# Patient Record
Sex: FEMALE | Race: WHITE | NOT HISPANIC OR LATINO | Employment: UNEMPLOYED | URBAN - METROPOLITAN AREA
[De-identification: names, ages, dates, MRNs, and addresses within clinical notes are randomized per-mention and may not be internally consistent; named-entity substitution may affect disease eponyms.]

---
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
## 2019-10-08 ENCOUNTER — TELEPHONE (OUTPATIENT)
Dept: PSYCHIATRY | Facility: CLINIC | Age: 19
End: 2019-10-08

## 2019-10-08 NOTE — TELEPHONE ENCOUNTER
PSYCHIATRY CLINIC PHONE INTAKE     SERVICES REQUESTED / INTERESTED IN          Med Management    Presenting Problem and Brief History                              What would you like to be seen for? (brief description):  Pt  Needs a new provider now that she's an adult. She was seeing a  in pediatrics, but he retired. She takes prozac- liquid version of 1 tsp. She;s been on it for years and has never needed a change. When she;s on her medications, she's far more tolerant and patient, and isn't grabby like she used to or pinchy. Sleep is pretty well. She likes to hang in her room often, so she may take a nap during the day, and she may stay up a little later at night. Its tough for her to get out and do things outside of the house. She can be very routine, and can have outburst if she hasn't time to process something new. She may start kicking the wall until there's a hole in it, and she may get hooked on it, because of the attention she gets from it. She's non-verbal.      Have you received a mental health diagnosis? Yes   Which one (s): Autism and sensory integration dysfunction.   Is there any history of developmental delay?  Yes   Are you currently seeing a mental health provider?  No            Who / month last seen:  NA  Do you have mental health records elsewhere?  No  Will you sign a release so we can obtain them?  No    Have you ever been hospitalized for psychiatric reasons?  No  Describe:  NA    Do you have current thoughts of self-harm?  No    Do you currently have thoughts of harming others?  Yes  - In the moment when she's upset or trying to get your attention.      Substance Use History     Do you have any history of alcohol / illicit drug use?  No  Describe:  NA  Have you ever received treatment for this?  No    Describe:  NA     Social History     Does the patient have a guardian?  No    Name / number: NA  Have you had an ACT team in last 12 months?  No  Describe: NA   Do you have any current  or past legal issues?  No  Describe: NA   OK to leave a detailed voicemail?  Yes    Medical/ Surgical History                                   Patient Active Problem List   Diagnosis     XT (exotropia), intermittent     Hypermetropia     Astigmatism     Autism          Medications             Current Outpatient Medications   Medication Sig Dispense Refill     FLUoxetine (PROZAC) 20 MG/5ML solution Take 5 mLs (20 mg) by mouth daily 150 mL 3         DISPOSITION      10/8/19 Intake completed. Sending to [facility] for review.  Marisel Álvarez,

## 2019-11-01 ENCOUNTER — TRANSFERRED RECORDS (OUTPATIENT)
Dept: HEALTH INFORMATION MANAGEMENT | Facility: CLINIC | Age: 19
End: 2019-11-01

## 2019-11-08 ENCOUNTER — TRANSFERRED RECORDS (OUTPATIENT)
Dept: HEALTH INFORMATION MANAGEMENT | Facility: CLINIC | Age: 19
End: 2019-11-08

## 2020-02-19 ENCOUNTER — OFFICE VISIT (OUTPATIENT)
Dept: PSYCHIATRY | Facility: CLINIC | Age: 20
End: 2020-02-19
Attending: NURSE PRACTITIONER
Payer: COMMERCIAL

## 2020-02-19 VITALS
HEART RATE: 121 BPM | WEIGHT: 200 LBS | BODY MASS INDEX: 37.57 KG/M2 | SYSTOLIC BLOOD PRESSURE: 108 MMHG | DIASTOLIC BLOOD PRESSURE: 75 MMHG

## 2020-02-19 DIAGNOSIS — F84.0 AUTISM: Primary | ICD-10-CM

## 2020-02-19 PROCEDURE — G0463 HOSPITAL OUTPT CLINIC VISIT: HCPCS | Mod: ZF

## 2020-02-19 RX ORDER — LORAZEPAM 2 MG/ML
SOLUTION, CONCENTRATE ORAL PRN
COMMUNITY
Start: 2019-05-02 | End: 2022-04-26

## 2020-02-19 ASSESSMENT — PAIN SCALES - GENERAL: PAINLEVEL: NO PAIN (0)

## 2020-02-19 NOTE — PROGRESS NOTES
PSYCHIATRY CHILD CLINIC TRANSFER  NOTE        The initial diagnostic evaluation was in 2009 through the autism clinic.    Pertinent Background:  This patient experienced developmental delay in early childhood and was diagnosed with autism at that time. Mental health issues occurred at school age and she first obtained mental health care at that time. She has received services within the community and through the school district for autism. She has received mental health services for anxiety, rigidity, and aggression, associated with the diagnosis of autism.  Notably, Hoda struggles with transitions and changes in routine. She may become aggressive towards others (pinching and scratching) during these times, but is more likely to engage in self-injury  Psych critical item history includes SIB [hitting self] and violent behavior.     INTERIM HISTORY                                                   Estuardo Hernadez is a 19 year old female who was last seen in clinic on 8/9/2017 at which time fluoxetine 20 mg daily was continued.    Since the last visit she continues to take fluoxetine 20 mg PO Q Day. Mother reports that fluoxetine seems to help most with high reactivity and she pinches others less. Medications have been managed by PCP since the [facility] of Dr Mayo. She has not required any medication changes. She is engaged in a transition program at school which she graduates from this year.     Social Updates (home/ school/ substance use): When not in school, Hoda spends most of her time at home. She does not use any substances, nor is she exposed to them.   Family relationships: Hoda is cared for primarily by her mother and older sister. She also lives with an older brother who is also on the autism spectrum. She has two other older brothers, one in the , and one who recently moved into a group home after a TBI. She does not currently have contact with her father, who left the home some time ago  and now lives with his partner. She has not met her step-mother.     School: [employer]  Year: will graduate in May from transition program at school  IEP/504/Special Education: IEP for ASD  Suspensions/Expulsions: none  School functioning: stable with significant supports and staffing    RECENT SYMPTOMS:   DYSREGULATION:  reports-impulsive, aggressive and physically agitated;  DENIES- suicidal ideation and SIB  ANXIETY:  feeling fearful and nervous/overwhelmed   EATING DISORDER: none    RECENT SUBSTANCE USE:     ALCOHOL- none          TOBACCO- none          CAFFEINE- no caffeine        OPIOIDS- none           NARCAN KIT- N/A    CANNABIS- none         OTHER ILLICIT DRUGS- none     CURRENT SOCIAL HISTORY:  Financial Support- social security disability, DD waiver on CDCS and get a SW through that.     Children- none.     Living Situation- lives at home with Mom, older brother (also with autism, and older sister, who provides PCA services. Mother has bought a lot in [place] and plans to buils a new home that will be able to also house Hoda, her brother with autism, and her brother with TBI as well as 25/7 staffing.   Social/Spiritual Support- family identifies as Mormonism.     Feels Safe at Home- Yes.    MEDICAL ROS:  Reports A comprehensive review of systems was performed and is negative other than noted in the HPI.    SUBSTANCE USE HISTORY                                                                             Past Use- none  Treatment [#, most recent] - none  Medical Consequences [withdrawal, sz etc] - none  HIV/Hepatitis- none  Legal Consequences- none    PSYCHIATRIC HISTORY     SIB [method, most recent]- will hit self  Suicidal Ideation Hx [passive, active]- none  Suicide Attempt [#, recent, method]:   #- N/A   Most Recent- N/A    Violence/Aggression Hx- sometimes pinches and slaps others when upset or anxious  Psychosis Hx- none  Psych Hosp [ #, most recent, committed]- none  ECT [#, most  recent]- none    Eating Disorder- none    Outpatient Programs [ DBT, Day Treatment, Eating Disorder Tx etc] : transition programing currently through school    SOCIAL and FAMILY HISTORY                                          patient reported     Trauma History (self-report)- none  Legal- none  Social/Spiritual Support- Hoda is close with her mother, brother who lives with her, and her older sister. Older sister and mother are primary caretakers.   Early History/Education-  IEP in school since early childhood for autism and developmental delay  Family Mental Health History-  Autism, learning disability  Financial Support- social security disability and DD waiver and CDCS, some voc rehab through school Mom sets budget currently gets 60 hours of PCA currently, mostly mom and sister                PAST PSYCH MED TRIALS   Prozac, moderately effective  Trazodone, in past for sleep    MEDICAL / SURGICAL HISTORY                                   CARE TEAM:    Older sister is PCA      PCP- Vilma Leo, [facility]                   Therapist- none currently    Pregnant or breastfeeding:  NO      Contraception- mother planning to have IUD placed in near future to help with periods. Hoda does not tolerate periods well and struggles with self-care during this time.  Patient Active Problem List   Diagnosis     XT (exotropia), intermittent     Hypermetropia     Astigmatism     Autism       ALLERGY                                Citrus and Dairy aid [lactase]  MEDICATIONS                               Current Outpatient Medications   Medication Sig Dispense Refill     FLUoxetine (PROZAC) 20 MG/5ML solution Take 5 mLs (20 mg) by mouth daily 150 mL 3     LORAZEPAM INTENSOL 2 MG/ML PO (HIGH CONC) solution as needed PRN for a dental procedure         VITALS   /75   Pulse 121   Wt 90.7 kg (200 lb)   BMI 37.57 kg/m     MENTAL STATUS EXAM                                                              Alertness: alert  and oriented  Appearance: casually groomed  Behavior/Demeanor: cooperative, with poor eye contact   Speech: Non-verbal  Language: non-verbal  Psychomotor: restless and fidgety  Mood: unable to report own mood, but appeared restless and anxious  Affect: flat; was not congruent to mood; was not congruent to content  Thought Process/Associations: unable to self-report. Did follow some prompts  Thought Content:  Reports none;  Denies suicidal and violent ideation  Perception: did not appear to be responding to internal stimuli  Insight: limited  Judgment: limited  Cognition: does not appear grossly intact; formal cognitive testing was not done    LABS and DATA     No flowsheet data found.      PSYCHIATRIC DIAGNOSES                                                                                                 Autism spectrum disorder  Sensory integration dysfunction     ASSESSMENT                                   TODAY   Hoda presents to clinic today overall cooperative, but restless. She presents today with her mother and her older sister, who serves as her PCA. She was observed to plug her ears and rock throughout the appointment time. She at times slapped herself in the face but was distracted for a bit by a fidget spinner. She responded well and calmed some when spoken to by mother or sister. Mother reports that Hoda seems to be well managed overall on her current dose of fluoxetine. She states that she is generally less anxious and is able to tolerate transitions better than without the fluoxetine on board. She does not feel medications need to be adjusted at this time. Plan made to continue fluoxetine 20 mg PO Q Day. Mother is unsure if they will be able to return to clinic in less than a year and reports that previously they were seen by Dr Mayo annually. Will follow-up with staff to see if Hoda is a candidate for telephone encounters due to low tolerance for car rides and new settings.  Mother informed that she may call with any questions, concerns, or if she feels an earlier appointment is necessary.                               PLAN                                                                                                       1) MEDICATION:      - Continue Prozac 20 mg PO Q Day   No refills needed at this time    2) THERAPY:  May recommend increasing services and day programming after moving to [facility]    3) LABS NEXT DUE:  none       RATING SCALES:     N/A    4) REFERRALS [CD, medical, other]:  none    5) :  none    6) RTC: will coordinate with staff to see if candidate for telephone visits. Mother unsure if they are able to return for approximately 1 year.    7) CRISIS NUMBERS: Provided by request in AVS      TREATMENT RISK STATEMENT:  The risks, benefits, alternatives and potential adverse effects have been discussed and are understood by the patient/ patient's guardian. The pt understands the risks of using street drugs or alcohol.  There are no medical contraindications, the pt agrees to treatment with the ability to do so.  The patient understands to call 911 or come to the nearest ED if life threatening or urgent symptoms present.

## 2020-02-20 ENCOUNTER — TELEPHONE (OUTPATIENT)
Dept: PSYCHIATRY | Facility: CLINIC | Age: 20
End: 2020-02-20

## 2020-02-20 NOTE — TELEPHONE ENCOUNTER
On 2/19/2020 the patient's guardian signed an SILVINO authorizing medical records to be released from [facility] to [facility] for the purpose of continuing care. I faxed the request to [phone], [phone]. I sent the original to SILVINO to scanning and kept a copy in psychiatry until scanning is complete.  Annalee Flores, CMA

## 2020-02-23 ENCOUNTER — HEALTH MAINTENANCE LETTER (OUTPATIENT)
Age: 20
End: 2020-02-23

## 2020-02-25 ENCOUNTER — TELEPHONE (OUTPATIENT)
Dept: PSYCHIATRY | Facility: CLINIC | Age: 20
End: 2020-02-25

## 2020-02-25 NOTE — TELEPHONE ENCOUNTER
On 2/24/2020, 54 faxed pages were received from [employer]. Writer placed the records in Anjelica Clinton's folder for review. Message routed to provider. ELAINA Geel, EMT

## 2020-02-27 ENCOUNTER — TELEPHONE (OUTPATIENT)
Dept: PSYCHIATRY | Facility: CLINIC | Age: 20
End: 2020-02-27

## 2020-02-27 NOTE — TELEPHONE ENCOUNTER
On 2/27/2020, [facility] This writer put a copy of the records in Anjelica Gaming's folder upfront and this was routed to Mirtha, please shred your copy when finished.  I sent the original documents to scanning on 2/27/2020 and held a copy in Psychiatry until scanning is confirmed. Britt Flores, CMA

## 2020-12-06 ENCOUNTER — HEALTH MAINTENANCE LETTER (OUTPATIENT)
Age: 20
End: 2020-12-06

## 2021-02-20 NOTE — TELEPHONE ENCOUNTER
Dr. Mayo    Received refill request for Fluoxetine 20 mg/5mL liquid  , quantity 150.    Please advise    Thanks,    Rissa   Diagnosis Line Sinus bradycardia  Early repolarization       Confirmed by GEOVANY MANE MD ([id number]) on 2/22/2021 12:55:07 PM

## 2021-04-11 ENCOUNTER — HEALTH MAINTENANCE LETTER (OUTPATIENT)
Age: 21
End: 2021-04-11

## 2021-04-21 ENCOUNTER — DOCUMENTATION ONLY (OUTPATIENT)
Dept: OTHER | Facility: CLINIC | Age: 21
End: 2021-04-21

## 2021-05-20 ENCOUNTER — VIRTUAL VISIT (OUTPATIENT)
Dept: PSYCHIATRY | Facility: CLINIC | Age: 21
End: 2021-05-20
Attending: NURSE PRACTITIONER
Payer: COMMERCIAL

## 2021-05-20 DIAGNOSIS — F84.0 AUTISM: Primary | ICD-10-CM

## 2021-05-20 DIAGNOSIS — F84.0 ACTIVE AUTISTIC DISORDER: ICD-10-CM

## 2021-05-20 PROCEDURE — 99214 OFFICE O/P EST MOD 30 MIN: CPT | Mod: GT | Performed by: NURSE PRACTITIONER

## 2021-05-20 RX ORDER — FLUOXETINE 20 MG/5ML
20 SOLUTION ORAL DAILY
Qty: 450 ML | Refills: 0 | Status: SHIPPED | OUTPATIENT
Start: 2021-05-20 | End: 2021-08-19

## 2021-05-20 ASSESSMENT — PAIN SCALES - GENERAL: PAINLEVEL: NO PAIN (0)

## 2021-05-20 NOTE — PROGRESS NOTES
"VIDEO VISIT  Estuardo Hernadez is a 21 year old patient who is being evaluated via a billable video visit.      The patient has been notified of following:   \"This video visit will be conducted via a call between you and your physician/provider. We have found that certain health care needs can be provided without the need for an in-person physical exam. This service lets us provide the care you need with a video conversation. If a prescription is necessary we can send it directly to your pharmacy. If lab work is needed we can place an order for that and you can then stop by our lab to have the test done at a later time. Insurers are generally covering virtual visits as they would in-office visits so billing should not be different than normal.  If for some reason you do get billed incorrectly, you should contact the billing office to correct it and that number is in the AVS .    Video Conference to be completed via:  Madeleine    Patient has given verbal consent for video visit?:  Yes    Patient would prefer that any video invitations be sent by: Send to e-mail at: [email or web address]      How would patient like to obtain AVS?:  Keyla    AVS SmartPhrase [PsychAVS] has been placed in 'Patient Instructions':  Yes    "

## 2021-05-20 NOTE — PATIENT INSTRUCTIONS
**For crisis resources, please see the information at the end of this document**     Patient Education      Thank you for coming to the [facility] MENTAL HEALTH & ADDICTION [place] [facility]IN[facility].    Lab Testing:  If you had lab testing today and your results are reassuring or normal they will be mailed to you or sent through ServiceGems within 7 days. If the lab tests need quick action we will call you with the results. The phone number we will call with results is # [phone] (home) . If this is not the best number please call our clinic and change the number.    Medication Refills:  If you need any refills please call your pharmacy and they will contact us. Our fax number for refills is [phone]. Please allow three business for refill processing. If you need to  your refill at a new pharmacy, please contact the new pharmacy directly. The new pharmacy will help you get your medications transferred.     Scheduling:  If you have any concerns about today's visit or wish to schedule another appointment please call our office during normal business hours [phone] (8-5:00 M-F)    Contact Us:  Please call [phone] during business hours (8-5:00 M-F).  If after clinic hours, or on the weekend, please call  [phone].    Financial Assistance [phone]  [facility] Billing [phone]  Central Billing Office, [facility]: [phone]  [facility] Billing [phone]  Medical Records [phone]  [facility] Patient Bill of Rights [email or web address]       MENTAL HEALTH CRISIS NUMBERS:  For a medical emergency please call  911 or go to the nearest ER.     [facility]:   [facility] -[phone]   Crisis Residence [facility] Residence -[phone]   Walk-In Counseling Center [facility] -[phone]   COPE 24/7 [place] Mobile Team -[phone] (adults)/[phone] (child)  CHILD: [facility] needs assessment  team - [phone]      [facility]:   [facility] - [phone]   Walk-in counseling [facility] - [phone]   Walk-in counseling [facility] - [phone]   Crisis Residence [facility] - [phone]  Urgent Care Adult Mental Rugojj-[phone] mobile unit/ 24/7 crisis line    National Crisis Numbers:   National Suicide Prevention Lifeline: 7-355-313-TALK (130-934-7879)  Poison Control Center - 0-294-366-3752  [facility]/resources for a list of additional resources (SOS)  Trans Lifeline a hotline for transgender people 1-744.919.6456  The Alin Project a hotline for LGBT youth 7-364-760-2711  Crisis Text Line: For any crisis 24/7   To: 425483  see www.crisistextline.org  - IF MAKING A CALL FEELS TOO HARD, send a text!         Again thank you for choosing [facility] MENTAL HEALTH & ADDICTION [facility] and please let us know how we can best partner with you to improve you and your family's health.    You may be receiving a survey regarding this appointment. We would love to have your feedback, both positive and negative. The survey is done by an external company, so your answers are anonymous.

## 2021-05-20 NOTE — PROGRESS NOTES
"VIDEO VISIT  Estuardo Hernadez is a 21 year old patient who is being evaluated via a billable video visit.      The patient has been notified of following:   \"This video visit will be conducted via a call between you and your physician/provider. We have found that certain health care needs can be provided without the need for an in-person physical exam. This service lets us provide the care you need with a video conversation. If a prescription is necessary we can send it directly to your pharmacy. If lab work is needed we can place an order for that and you can then stop by our lab to have the test done at a later time. Insurers are generally covering virtual visits as they would in-office visits so billing should not be different than normal.  If for some reason you do get billed incorrectly, you should contact the billing office to correct it and that number is in the AVS .    Video Conference to be completed via:  Madeleine    Patient has given verbal consent for video visit?:  Yes    Patient would prefer that any video invitations be sent by: Send to e-mail at: [email or web address]      How would patient like to obtain AVS?:  [employer]    AVS SmartPhrase [PsychAVS] has been placed in 'Patient Instructions':  Yes  Video- Visit Details  Type of service:  video visit for medication management  Time of service:    Date:  05/20/2021    Video Start Time:  10:31       Video End Time:  11:22    Reason for video visit:  Patient unable to travel due to Covid-19  Originating Site (patient location):  [facility]   Location- Patient's home  Distant Site (provider location):  Remote location  Mode of Communication:  Video Conference via Doxy.me  Consent:  Patient has given verbal consent for video visit?: Yes     PSYCHIATRY CLINIC PROGRESS NOTE    30 minute medication management   IDENTIFICATION: Estuardo Hernadez is a 21 year old female with previous psychiatric diagnoses of autism. Pt presents for ongoing " psychiatric follow-up and was seen for initial diagnostic evaluation on 2/19/2020.  SUBJECTIVE / INTERIM HISTORY     The pt was last seen in clinic 2/19/2021 at which time no medication changes were made. The patient reports good medication adherence. Since the last visit, she has been taking her medication daily as prescribed. Her PCP has been bridging the prescription during the last year. She has finished school with significant modification by Mom at home. She was not interested in engaging at school at home due to it being out of routine.     SYMPTOMS include ongoing mood stability. Per Mom, she has tried in the past to stop Prozac and Hoda became more reactive and quick to scratch family when upset. She feels the current dose is sufficient in managing these symptoms. She does continue to pick at skin or other items and is difficult to redirect at times. Mom reports limiting reactions is best for redirection.  She continues to struggle with over eating. If she sees the entire container she feels she needs to eat it until it is empty. Family manages this by putting food in separate containers. No safety concerns reported today.    Current Substance Use- none. Sober support- na     MEDICAL ROS          Reports A comprehensive review of systems was performed and is negative other than noted in the HPI.     PAST MEDICATION TRIALS    Prozac, moderately effective  Trazodone, in past for sleep  MEDICAL HISTORY      Primary Care Physician: Clinic, [facility] [address] [facility] [address]     Neurologic Hx:  head injury- denies     seizure- denies      LOC- denies    other- na   Patient Active Problem List   Diagnosis     XT (exotropia), intermittent     Hypermetropia     Astigmatism     Autism     ALLERGY       Allergies   Allergen Reactions     Citrus Dermatitis, Diarrhea, GI Disturbance and Other (See Comments)     Dairy Aid [Lactase]        MEDICATIONS      Current Outpatient Medications   Medication  Sig     FLUoxetine (PROZAC) 20 MG/5ML solution Take 5 mLs (20 mg) by mouth daily     LORAZEPAM INTENSOL 2 MG/ML PO (HIGH CONC) solution as needed PRN for a dental procedure     No current facility-administered medications for this visit.        Drug Interaction Check is remarkable for:  None  VITALS    There were no vitals taken for this visit.  LABS  use PSYCHLAB______       none    MENTAL STATUS EXAM     Alertness: alert  and oriented  Appearance: casually groomed  Behavior/Demeanor: calm, with poor eye contact  Speech: Pt did not speak  Language: Pt is mostly non verbal  Psychomotor: fidgety  Mood:  description consistent with euthymia  Affect: blunted; was congruent to mood; was congruent to content  Thought Process/Associations: unable to self-report but pt appeared to track conversation and follow direction.   Thought Content: Pt unable to self-report but no suicidal or homicidal behaviors observed  Perception: Pt unable to self-report but does not appear to be responding to internal stimuli  Insight: limited  Judgment: limited  Cognition: does not appear grossly intact; formal cognitive testing was not done     PSYCHOLOGICAL TESTING:     none    ASSESSMENT     Estuardo Hernadez is a 21 year old female with psychiatric diagnoses of autism. She was present for the majority of the appointment today and appeared to be watching the screen most of the time. Mom reports that the current medication plan has been effective and she would like to keep it the same. No changes at this time. Will follow-up in 3 months.       TREATMENT RISK STATEMENT:  The risks, benefits, alternatives and potential adverse effects have been explained and are understood by the pt and pt's parent(s)/guardian.  Discussion of specific concerns included- N/A. The  pt and pt's parent(s)/guardian agrees to the treatment plan with the ability to do so. The  pt and pt's parent(s)/guardian knows to call the clinic for any problems or access  emergency care if needed. There are no medical considerations relevant to treatment, as noted above. Substance use is not a problem as noted above.      Drug interaction check was done for any med changes and is discussed above.      DIAGNOSES                                                                                                      Encounter Diagnosis   Name Primary?     Active autistic disorder                                  PLAN                                                                                                 Medication Plan:         -- Continue Prozac 20 mg PO Q Day   90 days sent     Labs:  none    Pt monitor [call for probs]: nothing specific needed    THERAPY: No Change    REFERRALS [CD, medical, other]:  none    :  none    Controlled Substance Contract was not completed    RTC: 3 months    CRISIS NUMBERS: Provided in AVS upon request of patient/guardian.

## 2021-08-19 ENCOUNTER — VIRTUAL VISIT (OUTPATIENT)
Dept: PSYCHIATRY | Facility: CLINIC | Age: 21
End: 2021-08-19
Attending: NURSE PRACTITIONER
Payer: COMMERCIAL

## 2021-08-19 DIAGNOSIS — F84.0 AUTISM: Primary | ICD-10-CM

## 2021-08-19 DIAGNOSIS — F84.0 ACTIVE AUTISTIC DISORDER: ICD-10-CM

## 2021-08-19 PROCEDURE — 99214 OFFICE O/P EST MOD 30 MIN: CPT | Mod: GT | Performed by: NURSE PRACTITIONER

## 2021-08-19 RX ORDER — FLUOXETINE 20 MG/5ML
20 SOLUTION ORAL DAILY
Qty: 450 ML | Refills: 0 | Status: SHIPPED | OUTPATIENT
Start: 2021-08-19 | End: 2022-01-19

## 2021-08-19 ASSESSMENT — PAIN SCALES - GENERAL: PAINLEVEL: NO PAIN (0)

## 2021-08-19 NOTE — PATIENT INSTRUCTIONS
**For crisis resources, please see the information at the end of this document**     Patient Education      Thank you for coming to the [facility] MENTAL HEALTH & ADDICTION [place] [facility].    Lab Testing:  If you had lab testing today and your results are reassuring or normal they will be mailed to you or sent through JobOn within 7 days. If the lab tests need quick action we will call you with the results. The phone number we will call with results is # [phone] (home) . If this is not the best number please call our clinic and change the number.    Medication Refills:  If you need any refills please call your pharmacy and they will contact us. Our fax number for refills is [phone]. Please allow three business for refill processing. If you need to  your refill at a new pharmacy, please contact the new pharmacy directly. The new pharmacy will help you get your medications transferred.     Scheduling:  If you have any concerns about today's visit or wish to schedule another appointment please call our office during normal business hours [phone] (8-5:00 M-F)    Contact Us:  Please call [phone] during business hours (8-5:00 M-F).  If after clinic hours, or on the weekend, please call  [phone].    Financial Assistance [phone]  [facility] Billing [phone]  Central Billing Office, [facility]: [phone]  [facility] Billing [phone]  Medical Records [phone]  [facility] Patient Bill of Rights [email or web address]       MENTAL HEALTH CRISIS NUMBERS:  For a medical emergency please call  911 or go to the nearest ER.     [facility]:   [facility] -[phone]   Crisis Residence [facility] Residence -[phone]   Walk-In Counseling Center [facility] -[phone]   COPE 24/7 [place] Mobile Team -[phone] (adults)/[phone] (child)  CHILD: [facility] needs assessment  team - [phone]      [facility]:   [facility] - [phone]   Walk-in counseling [facility] House - [phone]   Walk-in counseling [facility] - [phone]   Crisis Residence [facility] Res[facility] - [phone]  Urgent Care Adult Mental Ffjmju-[phone] mobile unit/ 24/7 crisis line    National Crisis Numbers:   National Suicide Prevention Lifeline: 9-048-553-TALK (946-048-4755)  Poison Control Center - 5-101-001-1303  [employer]/resources for a list of additional resources (SOS)  Trans Lifeline a hotline for transgender people 1-509.836.1203  The Alin Project a hotline for LGBT youth 3-822-764-4124  Crisis Text Line: For any crisis 24/7   To: 239213  see www.crisistextline.org  - IF MAKING A CALL FEELS TOO HARD, send a text!         Again thank you for choosing [facility] MENTAL HEALTH & ADDICTION [facility] and please let us know how we can best partner with you to improve you and your family's health.    You may be receiving a survey regarding this appointment. We would love to have your feedback, both positive and negative. The survey is done by an external company, so your answers are anonymous.

## 2021-08-19 NOTE — PROGRESS NOTES
"VIDEO VISIT  Estuardo Hernadez is a 21 year old patient who is being evaluated via a billable video visit.      The patient has been notified of following:   \"This video visit will be conducted via a call between you and your physician/provider. We have found that certain health care needs can be provided without the need for an in-person physical exam. This service lets us provide the care you need with a video conversation. If a prescription is necessary we can send it directly to your pharmacy. If lab work is needed we can place an order for that and you can then stop by our lab to have the test done at a later time. Insurers are generally covering virtual visits as they would in-office visits so billing should not be different than normal.  If for some reason you do get billed incorrectly, you should contact the billing office to correct it and that number is in the AVS .    Video Conference to be completed via:  [employer]    Patient has given verbal consent for video visit?:  Yes    Patient would prefer that any video invitations be sent by: Send to e-mail at: [email or web address]      How would patient like to obtain AVS?:  [employer]    AVS SmartPhrase [PsychAVS] has been placed in 'Patient Instructions':  Yes  Video- Visit Details  Type of service:  video visit for medication management  Time of service:    Date:  08/19/2021    Video Start Time:  2:00        Video End Time:  2:25    Reason for video visit:  Patient unable to travel due to Covid-19  Originating Site (patient location):  [facility]   Location- Patient's home  Distant Site (provider location):  Remote location  Mode of Communication:  Video Conference via AmENOVIX  Consent:  Patient has given verbal consent for video visit?: Yes     PSYCHIATRY CLINIC PROGRESS NOTE    30 minute medication management   IDENTIFICATION: Estuardo Hernadez is a 21 year old female with previous psychiatric diagnoses of autism. Pt presents for ongoing " psychiatric follow-up and was seen for initial diagnostic evaluation on 2/19/2020.  SUBJECTIVE / INTERIM HISTORY     The pt was last seen in clinic 5/20/2021 at which time no medication changes were made. The patient reports good medication adherence. Since the last visit, has been taking her medication daily as prescribed. She has not displayed any side effects of the medication.     SYMPTOMS include ongoing mood stability. Per Mom, she continues to be less aggressive and irritable overall with Prozac. She continues to hit and kick at her drywall. She likes to pick it apart. Per Mom, she will eventually stop if it were to hurt. For example, she does not hit or kick the studs. She is spending more time in her room lately. Mom wonders if related to Kiran being out of his room more now that he is also not in school. No safety concerns reported today.  Current Substance Use- none. Sober support- na     MEDICAL ROS          Reports A comprehensive review of systems was performed and is negative other than noted in the HPI.     PAST MEDICATION TRIALS    Prozac, moderately effective  Trazodone, in past for sleep  MEDICAL HISTORY      Primary Care Physician: Clinic, [facility] at [address] [facility] [address]     Neurologic Hx:  head injury- none     seizure- none      LOC- none    other- na   Patient Active Problem List   Diagnosis     XT (exotropia), intermittent     Hypermetropia     Astigmatism     Autism     ALLERGY       Allergies   Allergen Reactions     Citrus Dermatitis, Diarrhea, GI Disturbance and Other (See Comments)     Dairy Aid [Lactase]        MEDICATIONS      Current Outpatient Medications   Medication Sig     FLUoxetine (PROZAC) 20 MG/5ML solution Take 5 mLs (20 mg) by mouth daily     LORAZEPAM INTENSOL 2 MG/ML PO (HIGH CONC) solution as needed PRN for a dental procedure     No current facility-administered medications for this visit.       Drug Interaction Check is remarkable  for:  None  VITALS    There were no vitals taken for this visit.  LABS  use PSYCHLAB______       none    MENTAL STATUS EXAM     Alertness: alert  and oriented  Appearance: casually groomed  Behavior/Demeanor: calm, with poor eye contact  Speech: Pt did not speak  Language: Pt is mostly non verbal  Psychomotor: fidgety  Mood:  description consistent with euthymia  Affect: blunted; was congruent to mood; was congruent to content  Thought Process/Associations: unable to self-report but pt appeared to track conversation and follow direction.   Thought Content: Pt unable to self-report but no suicidal or homicidal behaviors observed  Perception: Pt unable to self-report but does not appear to be responding to internal stimuli  Insight: limited  Judgment: limited  Cognition: does not appear grossly intact; formal cognitive testing was not done     PSYCHOLOGICAL TESTING:     none    ASSESSMENT     Estuardo Hernadez is a 21 year old female with psychiatric diagnoses of autism. She was engaged for only the beginning of the appointment today but did wave hello and goodbye after she was excused.  She also walked back over later in the appointment to look at the computer briefly. Mom reports that the current medication plan has been effective and she would like to keep it the same. No changes at this time. Will follow-up in 3 months.     TREATMENT RISK STATEMENT:  The risks, benefits, alternatives and potential adverse effects have been explained and are understood by the pt and pt's parent(s)/guardian.  Discussion of specific concerns included- N/A. The  pt and pt's parent(s)/guardian agrees to the treatment plan with the ability to do so. The  pt and pt's parent(s)/guardian knows to call the clinic for any problems or access emergency care if needed. There are no medical considerations relevant to treatment, as noted above. Substance use is not a problem as noted above.      Drug interaction check was done for any  med changes and is discussed above.      DIAGNOSES                                                                                                      Encounter Diagnosis   Name Primary?     Autism Yes                                 PLAN                                                                                                 Medication Plan:         -- Continue Prozac 20 mg PO Q Day                 90 days sent     Labs:  none    Pt monitor [call for probs]: nothing specific needed    THERAPY: No Change    REFERRALS [CD, medical, other]:  none    :  none    Controlled Substance Contract was not completed    RTC: 3 months    CRISIS NUMBERS: Provided in AVS upon request of patient/guardian.

## 2021-09-26 ENCOUNTER — HEALTH MAINTENANCE LETTER (OUTPATIENT)
Age: 21
End: 2021-09-26

## 2021-09-30 ENCOUNTER — TELEPHONE (OUTPATIENT)
Dept: PSYCHIATRY | Facility: CLINIC | Age: 21
End: 2021-09-30

## 2021-09-30 NOTE — LETTER
September 30, 2021      TO: Estuardo Hernadez  [address] [place] [address]       To whom it may concern,    Estuardo Hernadez is under my care at the [facility]. She is receiving treatment to manage autism [F84.0]. People with autism often experience sensory dysregulation and are sensitive to light, sound, and touch. When they become dysregulated, they may regress in behavior, become aggressive toward others, or become aggressive toward themselves. Motions like rocking are often helpful with self-regulation.  has already shown benefit from rocking motions in the office chair she currently has at home. Unfortunately, standard office furniture is not designed with this use in mind. For these reasons,  should be ordered a rocking chair for self-regulation and sensory integration.      Sincerely,    Anjelica Harrell DNP, PMHNP-BC  Signed electronically 9/30/2021 at 12:46

## 2022-01-19 ENCOUNTER — VIRTUAL VISIT (OUTPATIENT)
Dept: PSYCHIATRY | Facility: CLINIC | Age: 22
End: 2022-01-19
Payer: COMMERCIAL

## 2022-01-19 DIAGNOSIS — F84.0 ACTIVE AUTISTIC DISORDER: ICD-10-CM

## 2022-01-19 DIAGNOSIS — F84.0 AUTISM: Primary | ICD-10-CM

## 2022-01-19 PROCEDURE — 99214 OFFICE O/P EST MOD 30 MIN: CPT | Mod: GT | Performed by: NURSE PRACTITIONER

## 2022-01-19 RX ORDER — FLUOXETINE 20 MG/5ML
20 SOLUTION ORAL DAILY
Qty: 450 ML | Refills: 0 | Status: SHIPPED | OUTPATIENT
Start: 2022-01-19 | End: 2022-04-26

## 2022-01-19 NOTE — PROGRESS NOTES
Estuardo Hernadez is a 21 year old female who is being evaluated via a billable video visit.      How would you like to obtain your AVS? through [employer]  Primary method for receiving video invitation: [employer]  If the video visit is dropped, the invitation should be resent by: Send to e-mail at: [email or web address]  Will anyone else be joining your video visit? No    Christen Alan CMA    Video Start Time: 2:01  Video-Visit Details    Type of service:  Video Visit    Video End Time:2:17    Originating Location (pt. Location): Home    Distant Location (provider location):  remote location    Platform used for Video Visit: [facility]  PSYCHIATRY CLINIC PROGRESS NOTE    30 minute medication management   IDENTIFICATION: Estuardo Hernadez is a 21 year old female with previous psychiatric diagnoses of autism. Pt presents for ongoing psychiatric follow-up and was seen for initial diagnostic evaluation on 2/19/2020.  SUBJECTIVE / INTERIM HISTORY     The pt was last seen in clinic 8/19/2021 at which time no medication changes were made. The patient reports good medication adherence. Since the last visit, she has taken her medication daily as prescribed. No side effects have been observed. One of the staff at home got COVID but no one else has been sick.    SYMPTOMS include ongoing general mood stability. She continues to be less aggressive and reactive with Prozac. Per Mom, there have not been any significant changes in her environment or life. She continues to be annoyed by people talking and will prefer to spend time in her room. But she has not been physically aggressive. No safety concerns reported today.     Current Substance Use- none. Sober support- na     MEDICAL ROS          Reports A comprehensive review of systems was performed and is negative other than noted in the HPI.    PAST MEDICATION TRIALS    Prozac, moderately effective  Trazodone, in past for sleep  MEDICAL HISTORY      Primary Care  Physician: Clinic, [facility] [address] [facility] [place] [address]     Neurologic Hx:  head injury- denies     seizure- denies      LOC- denies    other- na   Patient Active Problem List   Diagnosis     XT (exotropia), intermittent     Hypermetropia     Astigmatism     Autism     ALLERGY       Allergies   Allergen Reactions     Citrus Dermatitis, Diarrhea, GI Disturbance and Other (See Comments)     Dairy Aid [Lactase]        MEDICATIONS      Current Outpatient Medications   Medication Sig     FLUoxetine (PROZAC) 20 MG/5ML solution Take 5 mLs (20 mg) by mouth daily     LORAZEPAM INTENSOL 2 MG/ML PO (HIGH CONC) solution as needed PRN for a dental procedure     No current facility-administered medications for this visit.       Drug Interaction Check is remarkable for:  None  VITALS    There were no vitals taken for this visit.  LABS  use PSYCHLAB______       none    MENTAL STATUS EXAM     Alertness: alert  and oriented  Appearance: casually groomed  Behavior/Demeanor: calm, with poor eye contact  Speech: Pt did not speak  Language: Pt is mostly non verbal  Psychomotor: fidgety  Mood:  description consistent with euthymia  Affect: blunted primarily but also laughing at times; was congruent to mood; was congruent to content  Thought Process/Associations: unable to self-report but pt appeared to track conversation and follow direction.   Thought Content: Pt unable to self-report but no suicidal or homicidal behaviors observed  Perception: Pt unable to self-report but does not appear to be responding to internal stimuli  Insight: limited  Judgment: limited  Cognition: does not appear grossly intact; formal cognitive testing was not done     PSYCHOLOGICAL TESTING:     none    ASSESSMENT     Estuardo Hernadez is a 21 year old female with psychiatric diagnoses of autism. She was engaged for only the beginning of the appointment today but did giggle and look at the computer periodically before leaving. She  did wait for permission to leave the appointment today before walking away from the computer. Mom would like to keep medication the same today. Follow-up planned for 3 months. Mom to reach out sooner with any questions, concerns, or if an earlier appointment is necessary.       TREATMENT RISK STATEMENT:  The risks, benefits, alternatives and potential adverse effects have been explained and are understood by the pt and pt's parent(s)/guardian.  Discussion of specific concerns included- N/A. The  pt and pt's parent(s)/guardian agrees to the treatment plan with the ability to do so. The  pt and pt's parent(s)/guardian knows to call the clinic for any problems or access emergency care if needed. There are no medical considerations relevant to treatment, as noted above. Substance use is not a problem as noted above.      Drug interaction check was done for any med changes and is discussed above.      DIAGNOSES                                                                                                      Encounter Diagnosis   Name Primary?     Autism Yes                             PLAN                                                                                                 Medication Plan:         -- Continue Prozac 20 mg PO Q Day                 90 days sent     Labs:  none    Pt monitor [call for probs]: nothing specific needed    THERAPY: No Change    REFERRALS [CD, medical, other]:  none    :  none    Controlled Substance Contract was not completed    RTC: 3 months    CRISIS NUMBERS: Provided in AVS upon request of patient/guardian.

## 2022-01-19 NOTE — PATIENT INSTRUCTIONS
**For crisis resources, please see the information at the end of this document**   Patient Education    Thank you for coming to the [facility].    Lab Testing:  If you had lab testing today and your results are reassuring or normal they will be mailed to you or sent through [employer] within 7 days. If the lab tests need quick action we will call you with the results. The phone number we will call with results is # [phone] (home) . If this is not the best number please call our clinic and change the number.    Medication Refills:  If you need any refills please call your pharmacy and they will contact us. Our fax number for refills is [phone]. Please allow three business for refill processing. If you need to  your refill at a new pharmacy, please contact the new pharmacy directly. The new pharmacy will help you get your medications transferred.     Scheduling:  If you have any concerns about today's visit or wish to schedule another appointment please call our office during normal business hours [phone] (8-5:00 M-F)    Contact Us:  Please call [phone] during business hours (8-5:00 M-F).  If after clinic hours, or on the weekend, please call  [phone].    Financial Assistance [phone]  [facility] Billing [phone]  Central Billing Office, [facility]: [phone]  [facility] Billing [phone]  Medical Records [phone]  [facility] Patient Bill of Rights [email or web address]       MENTAL HEALTH CRISIS NUMBERS:  For a medical emergency please call  911 or go to the nearest ER.     [facility]:   [facility] -[phone]   Crisis Residence [facility] Residence -[phone]   Walk-In Counseling Center [facility] -[phone]   COPE 24/7 [place] Mobile Team -[phone] (adults)/[phone] (child)  CHILD: [facility] needs assessment team - [phone]       [facility]:   [facility] - [phone]   Walk-in counseling [facility] - [phone]   Walk-in counseling [facility] - [phone]   Crisis Residence [facility] - [phone]  Urgent Care Adult Mental [facility]-[phone] mobile unit/ 24/7 crisis line    National Crisis Numbers:   National Suicide Prevention Lifeline: 5-945-411-TALK (660-420-0299)  Poison Control Center - 8-075-711-8513  StartX/resources for a list of additional resources (SOS)  Trans Lifeline a hotline for transgender people 3-803-318-7943  The [employer] Project a hotline for LGBT youth 1-215.590.1752  Crisis Text Line: For any crisis 24/7   To: 412297  see www.crisistextline.org  - IF MAKING A CALL FEELS TOO HARD, send a text!         Again thank you for choosing [facility] and please let us know how we can best partner with you to improve you and your family's health.    You may be receiving a survey regarding this appointment. We would love to have your feedback, both positive and negative. The survey is done by an external company, so your answers are anonymous.

## 2022-04-14 NOTE — PATIENT INSTRUCTIONS
Fela Tirado is a 26 year old who is here for evaluation and management of    New pt     S/p ER visit Sunday due to impeding sensation of a heart attack   With chest pain and stuttering   Told to have panic attacks   Labs, ECG and chest x-ray done at [facility] -told all normal  Patient brought the discharge summary--records not available to me    Hx Anxiety since age 18   Was on Fluoxetine 10 mg for 1-2 year  Was able to control her panic attacks with breathing techniques and exercise   Getting worse for last several months ago -panic attacks more often  Not seen a PCP in 4 years   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     No current facility-administered medications for this visit.       ALLERGIES:   Allergen Reactions   • Seasonal Runny Nose       Past Medical History:   Diagnosis Date   • Anxiety        History reviewed. No pertinent surgical history.    Social History     Socioeconomic History   • Marital status: Unknown     Spouse name: Not on file   • Number of children: Not on file   • Years of education: Not on file   • Highest education level: Not on file   Occupational History   • Not on file   Tobacco Use   • Smoking status: Never Smoker   • Smokeless tobacco: Never Used   Vaping Use   • Vaping Use: never used   Substance and Sexual Activity   • Alcohol use: Not Currently     Comment: social   • Drug use: Never   • Sexual activity: Not on file   Other Topics Concern   • Not on file   Social History Narrative   • Not on file     Social Determinants of Health  Thank you for coming to the PSYCHIATRY CLINIC.    Lab Testing:  If you had lab testing today and your results are reassuring or normal they will be mailed to you or sent through [employer] within 7 days.   If the lab tests need quick action we will call you with the results.  The phone number we will call with results is # [phone] (home) . If this is not the best number please call our clinic and change the number.    Medication Refills:  If you need any refills please call your pharmacy and they will contact us. Our fax number for refills is [phone]. Please allow three business for refill processing.   If you need to  your refill at a new pharmacy, please contact the new pharmacy directly. The new pharmacy will help you get your medications transferred.     Scheduling:  If you have any concerns about today's visit or wish to schedule another appointment please call our office during normal business hours [phone] (8-5:00 M-F)    Contact Us:  Please call [phone] during business hours (8-5:00 M-F).  If after clinic hours, or on the weekend, please call  [phone].    Financial Assistance [phone]  [facility] Billing [phone]  Central Billing Office, [facility]: [phone]  [facility] Billing [phone]  Medical Records [phone]      MENTAL HEALTH CRISIS NUMBERS:  [facility]:   [facility] - [phone]   Crisis Residence [facility] - [phone]   Walk-In Counseling [facility] [phone]   COPE 24/7 [place] Mobile Team for Adults - [[phone]]; Child - [[phone]]        [facility]:   [facility] - [phone]   Walk-in counseling [facility] - [phone]   Walk-in counseling [facility] - [phone]   Crisis Residence [facility] - [phone]   Urgent Care Adult Mental Health:   --Drop-in, 24/7 crisis line, and [place] Mobile Team      Financial Resource Strain: Not on file   Food Insecurity: Not on file   Transportation Needs: Not on file   Physical Activity: Not on file   Stress: Not on file   Social Connections: Not on file   Intimate Partner Violence: Not on file       Family History   Problem Relation Age of Onset   • Anxiety disorder Mother    • Allergic Rhinitis Father        Review of systems:  Eye Problem(s): negative  ENT Problem(s): negative  Cardiovascular problem(s): negative  Respiratory problem(s): negative  Gastro-intestinal problem(s): negative  Genito-urinary problem(s): negative  Musculoskeletal problem(s): negative  Integumentary problem(s): negative  Neurological problem(s): negative  Psychiatric problem(s): negative  Endocrine problem(s): negative  Hematologic and/or Lymphatic problem(s): negative    Physical Examination  Blood pressure 119/69, pulse 78, temperature 98.2 °F (36.8 °C), temperature source Temporal, height 5' 3\" (1.6 m), weight 103.4 kg (228 lb), last menstrual period 03/25/2022.   Well nourished, comfortable, in no acute distress at rest  HEENT normal  No JVD, normal carotid pulse, no thyromegaly  Lungs clear to auscultation, normal respiratory effort  Normal heart sounds S1 S2, regular rate and rhythm no murmur or gallop  Abdomen soft, not distended, normal bowel sounds  Extremities: no edema, no cyanosis, normal pedal pulses  Alert, oriented x3, normal motor function grossly, normal gait  Skin: normal, no abnormal lesions      No results found for any previous visit.       Assessment and Plan:  Anxiety  Start  - FLUoxetine (PROzac) 10 MG capsule; Take 1 capsule by mouth daily.  Dispense: 90 capsule; Refill: 2  Along with regular exercise 30 minutes daily  Duration of treatment discussed-needs to be monitored, likely 6 months at least    Side effects discussed  Panic attack  Start  - FLUoxetine (PROzac) 10 MG capsule; Take 1 capsule by mouth daily.  Dispense: 90 capsule; Refill: 2  Needs to be  [[phone]]    CRISIS TEXT LINE: Text 588-850 from anywhere, anytime, any crisis 24/7;    OR SEE www.crisistextline.org     National Suicide Prevention Lifeline: 724-893-QLSM (449-727-6683)    Poison Control Center - 0-828-990-1844    CHILD: [facility] needs assessment team - [phone]     [facility] Lifeline - 1-595.205.5322; or [facility] Lifeline - 1-152.292.2703    If you have a medical emergency please call 911or go to the nearest ER.                    _____________________________________________    Again thank you for choosing PSYCHIATRY CLINIC and please let us know how we can best partner with you to improve you and your family's health.  You may be receiving a survey regarding this appointment. We would love to have your feedback, both positive and negative. The survey is done by an external company, so your answers are anonymous.      monitored  Consider behavioral health-patient declines due to lack of insurance    Allergic rhinitis, unspecified seasonality, unspecified trigger  Claritin or Allegra over-the-counter    Morbid obesity with BMI of 40.0-44.9, adult (CMS/[facility])  10 pounds weight loss  Diet and exercise       Return to clinic in 6 weeks.     Spent 20 min with patient for coordination of care.        Emily Barone MD

## 2022-04-26 ENCOUNTER — VIRTUAL VISIT (OUTPATIENT)
Dept: PSYCHIATRY | Facility: CLINIC | Age: 22
End: 2022-04-26
Payer: COMMERCIAL

## 2022-04-26 DIAGNOSIS — F84.0 ACTIVE AUTISTIC DISORDER: ICD-10-CM

## 2022-04-26 DIAGNOSIS — F84.0 AUTISM: Primary | ICD-10-CM

## 2022-04-26 PROCEDURE — 99214 OFFICE O/P EST MOD 30 MIN: CPT | Mod: GT | Performed by: NURSE PRACTITIONER

## 2022-04-26 RX ORDER — FLUOXETINE 20 MG/5ML
20 SOLUTION ORAL DAILY
Qty: 450 ML | Refills: 0 | Status: SHIPPED | OUTPATIENT
Start: 2022-04-26 | End: 2022-08-18

## 2022-04-26 RX ORDER — LORAZEPAM 2 MG/ML
2 CONCENTRATE ORAL DAILY PRN
Qty: 30 ML | Refills: 0 | Status: SHIPPED | OUTPATIENT
Start: 2022-04-26 | End: 2022-11-10

## 2022-04-26 NOTE — PROGRESS NOTES
VIDEO VISIT  Estuardo Hernadez is a 22 year old patient who is being evaluated via a billable video visit.      How would you like to obtain your AVS?: MyChart  AVS SmartPhrase [PsychAVS] has been placed in 'Patient Instructions': Yes      Video- Visit Details  Type of service:  video visit for medication management  Time of service:    Start Time:  1:07PM    End Time:  1:30 PM    Originating Site (patient location):  [facility]   Location- Patient's home  Distant Site (provider location):  [facility]  Mode of Communication:  Video Conference via [facility]   PSYCHIATRY [facility] PROGRESS NOTE    30 minute medication management   IDENTIFICATION: Estuardo Hernadez is a 21 year old female with previous psychiatric diagnoses of autism. Pt presents for ongoing psychiatric follow-up and was seen for initial diagnostic evaluation on 2/19/2020.  SUBJECTIVE / INTERIM HISTORY     The pt was last seen in clinic 1/19/2022 at which time no medication changes were made. The patient reports good medication adherence. Since the last visit, she has taken her medication daily as prescribed. Mom reports that medication is effective and denies side effects.    SYMPTOMS include ongoing mood stability. Mom reports that prozac continues to be helpful for aggression and reactivity. She continues to dislike people talking around her(especially mom). Hoda has also been spending more time in her room, which mom relates to her brother being home more often. No safety concerns reported today.    Current Substance Use- none. Sober support- na    MEDICAL ROS          Reports A comprehensive review of systems was performed and is negative other than noted in the HPI.    PAST MEDICATION TRIALS    Prozac, current, moderately effective  Trazodone, in past for sleep  MEDICAL HISTORY      Primary Care Physician: Clinic, [facility] [address] [facility] [place] [address]     Neurologic Hx:  head injury- none     seizure-  none      LOC- none    other- na  Patient Active Problem List   Diagnosis     XT (exotropia), intermittent     Hypermetropia     Astigmatism     Autism     ALLERGY       Allergies   Allergen Reactions     Citrus Dermatitis, Diarrhea, GI Disturbance and Other (See Comments)     Dairy Aid [Lactase]        MEDICATIONS      Current Outpatient Medications   Medication Sig     FLUoxetine (PROZAC) 20 MG/5ML solution Take 5 mLs (20 mg) by mouth daily     LORAZEPAM INTENSOL 2 MG/ML PO (HIGH CONC) solution as needed PRN for a dental procedure     No current facility-administered medications for this visit.       Drug Interaction Check is remarkable for:  None  VITALS    There were no vitals taken for this visit.  LABS  use PSYCHLAB______       None    MENTAL STATUS EXAM     Alertness: alert  and oriented  Appearance: casually groomed  Behavior/Demeanor: calm, with poor eye contact  Speech: Pt did not speak  Language: Pt is mostly non-verbal  Psychomotor: fidgety  Mood:  description consistent with euthymia  Affect: blunted;   Thought Process/Associations: Unable to self-report, was able to follow directions  Thought Content: Pt unable to self-report but no suicidal or homicidal behaviors observed  Perception: Pt unable to self-report but does not appear to be responding to internal stimuli  Insight: limited  Judgment: limited  Cognition: does not appear grossly intact; formal cognitive testing was not done     PSYCHOLOGICAL TESTING:     None     ASSESSMENT     Estuardo Hernadez is a 22 year old female with psychiatric diagnoses of autism. She was engaged briefly at the beginning of the video visit today, but left after getting permission to leave the appointment. Mom would like to keep medications the same today. Follow up planned for 3 months . Mom to reach out with any questions concerns, or if an earlier appointment is necessary      TREATMENT RISK STATEMENT:  The risks, benefits, alternatives and potential  adverse effects have been explained and are understood by the pt and pt's parent(s)/guardian.  Discussion of specific concerns included- N/A. The  pt and pt's parent(s)/guardian agrees to the treatment plan with the ability to do so. The  pt and pt's parent(s)/guardian knows to call the clinic for any problems or access emergency care if needed. There are no medical considerations relevant to treatment, as noted above. Substance use is not a problem as noted above.      Drug interaction check was done for any med changes and is discussed above.      DIAGNOSES                                                                                                      Encounter Diagnosis   Name Primary?     Autism Yes                               PLAN                                                                                                 Medication Plan:         -- Continue Prozac 20 mg PO Q Day                 90 days sent     Labs:  none    Pt monitor [call for probs]: nothing specific needed    THERAPY: No Change    REFERRALS [CD, medical, other]:  none    :  none    Controlled Substance Contract was not completed    RTC: 3 months    CRISIS NUMBERS: Provided in AVS upon request of patient/guardian.

## 2022-05-07 ENCOUNTER — HEALTH MAINTENANCE LETTER (OUTPATIENT)
Age: 22
End: 2022-05-07

## 2022-08-18 ENCOUNTER — VIRTUAL VISIT (OUTPATIENT)
Dept: PSYCHIATRY | Facility: CLINIC | Age: 22
End: 2022-08-18
Payer: COMMERCIAL

## 2022-08-18 DIAGNOSIS — F84.0 AUTISM: Primary | ICD-10-CM

## 2022-08-18 DIAGNOSIS — F84.0 ACTIVE AUTISTIC DISORDER: ICD-10-CM

## 2022-08-18 PROCEDURE — 99214 OFFICE O/P EST MOD 30 MIN: CPT | Mod: GT | Performed by: NURSE PRACTITIONER

## 2022-08-18 RX ORDER — FLUOXETINE 20 MG/5ML
20 SOLUTION ORAL DAILY
Qty: 450 ML | Refills: 0 | Status: SHIPPED | OUTPATIENT
Start: 2022-08-18 | End: 2022-11-10

## 2022-08-18 NOTE — PROGRESS NOTES
Estuardo Hernadez is a 22 year old female who is being evaluated via a billable video visit.        How would you like to obtain your AVS? through [email or web address]  Primary method for receiving video invitation: [email or web address]  If the video visit is dropped, the invitation should be resent by: Text to cell phone: [phone]  Will anyone else be joining your video visit? Yes: mom. How would they like to receive their invitation? Text to cell phone: [phone]      Type of service:  Video Visit    Video-Visit Details    Video Start Time: 3:35    Video End Time:3:52  Originating Location (pt. Location): Home    Distant Location (provider location): remote location    Platform used for Video Visit: [facility]    PSYCHIATRY CLINIC PROGRESS NOTE    30 minute medication management   IDENTIFICATION: Estuardo Hernadez is a 22 year old female with previous psychiatric diagnoses of autism. Pt presents for ongoing psychiatric follow-up and was seen for initial diagnostic evaluation on 2/19/2020.  SUBJECTIVE / INTERIM HISTORY     The pt was last seen in clinic 4/26/2022 at which time no medication changes were made. The patient reports good medication adherence. Since the last visit,  she has taken her medication daily as prescribed. Mom reports that medication is effective and denies side effects. They are breaking ground soon on the new house.    SYMPTOMS include ongoing mood stability. Mom reports that Hoda continues to be overall less anxious and calmer with Prozac. She continues to spend a lot of time in her room but will come out and play with family. No safety concerns reported today.      Current Substance Use- none. Sober support- na     MEDICAL ROS          Reports A comprehensive review of systems was performed and is negative other than noted in the HPI.    PAST MEDICATION TRIALS    Prozac, current, moderately effective  Trazodone, in past for sleep  MEDICAL HISTORY      Primary Care Physician: [place], [facility]  [place] [address] [facility] [place] [address]     Neurologic Hx:  head injury- none     seizure- none      LOC- none    other- na   Patient Active Problem List   Diagnosis     XT (exotropia), intermittent     Hypermetropia     Astigmatism     Autism     ALLERGY       Allergies   Allergen Reactions     Citrus Dermatitis, Diarrhea, GI Disturbance and Other (See Comments)     Dairy Aid [Lactase]        MEDICATIONS      Current Outpatient Medications   Medication Sig     FLUoxetine (PROZAC) 20 MG/5ML solution Take 5 mLs (20 mg) by mouth daily     LORazepam (LORAZEPAM INTENSOL) 2 MG/ML (HIGH CONC) oral solution Take 1 mL (2 mg) by mouth daily as needed (prior to medical or dental procedures/appointments)     No current facility-administered medications for this visit.       Drug Interaction Check is remarkable for:  None  VITALS    There were no vitals taken for this visit.  LABS  use PSYCHLAB______       none    MENTAL STATUS EXAM     Alertness: alert  and oriented  Appearance: casually groomed  Behavior/Demeanor: calm, with poor eye contact  Speech: Pt did not speak  Language: Pt is mostly non-verbal  Psychomotor: calm  Mood:  description consistent with euthymia  Affect: blunted   Thought Process/Associations: Unable to self-report, was able to follow directions  Thought Content: Pt unable to self-report but no suicidal or homicidal behaviors observed  Perception: Pt unable to self-report but does not appear to be responding to internal stimuli  Insight: limited  Judgment: limited  Cognition: does not appear grossly intact; formal cognitive testing was not done      PSYCHOLOGICAL TESTING:     none    ASSESSMENT     Estuardo Hernadez is a 22 year old female with psychiatric diagnoses of autism. She was engaged briefly at the beginning of the video visit today, but only shortly. Today Mom and sister are both present with oHda. They report things are going well. All are in agreement with keeping the medication the  same today. Follow-up planned for 3 months. Mom to reach out sooner with any questions, concerns, or if an earlier appointment is needed.      TREATMENT RISK STATEMENT:  The risks, benefits, alternatives and potential adverse effects have been explained and are understood by the pt and pt's parent(s)/guardian.  Discussion of specific concerns included- N/A. The  pt and pt's parent(s)/guardian agrees to the treatment plan with the ability to do so. The  pt and pt's parent(s)/guardian knows to call the clinic for any problems or access emergency care if needed. There are no medical considerations relevant to treatment, as noted above. Substance use is not a problem as noted above.      Drug interaction check was done for any med changes and is discussed above.      DIAGNOSES                                                                                                      Encounter Diagnosis   Name Primary?     Autism Yes                                   PLAN                                                                                                 Medication Plan:         -- Continue Prozac 20 mg PO Q Day                 90 days sent        -- continue lorazepam 2 mg PO Q Day PRN prior to medical or dental procedures   Refills not needed    Labs:  none    Pt monitor [call for probs]: nothing specific needed    THERAPY: No Change    REFERRALS [CD, medical, other]:  none    :  none    Controlled Substance Contract was not completed    RTC: 3 months    CRISIS NUMBERS: Provided in AVS upon request of patient/guardian.

## 2022-08-18 NOTE — PATIENT INSTRUCTIONS
**For crisis resources, please see the information at the end of this document**   Patient Education    Thank you for coming to the [facility].     Lab Testing:  If you had lab testing today and your results are reassuring or normal they will be mailed to you or sent through [facility] within 7 days. If the lab tests need quick action we will call you with the results. The phone number we will call with results is # [phone]. If this is not the best number please call our clinic and change the number.     Medication Refills:  If you need any refills please call your pharmacy and they will contact us. Our fax number for refills is [phone].   Three business days of notice are needed for general medication refill requests.   Five business days of notice are needed for controlled substance refill requests.   If you need to change to a different pharmacy, please contact the new pharmacy directly. The new pharmacy will help you get your medications transferred.     Contact Us:  Please call [phone] during business hours (8-5:00 M-F).   If you have medication related questions after clinic hours, or on the weekend, please call [phone].     Financial Assistance [phone]   Medical Records [phone]       MENTAL HEALTH CRISIS RESOURCES:  For a emergency help, please call 911 or go to the nearest Emergency Department.     Emergency Walk-In Options:   EmPATH Unit @ [place] ([place]): [phone] - Specialized mental health emergency area designed to be calming  [facility] ([place]): [phone]  [facility] Services ([place]): [phone]  [facility]): [phone]    [facility] Crisis Information:   [place]: [phone]  [place]: [phone]  [place] ([place]) - Adult: [phone]     Child: [phone]  [place] - Adult: [phone]     Child: [phone]  [place]: [phone]  List of all [place]  [facility] resources:   https://[place].gov/dhs/people-we-serve/adults/health-care/mental-health/resources/crisis-contacts.jsp    National Crisis Information:   Crisis Text Line: Text  [place]  to 873630  Suicide & Crisis Lifeline: 988  National Suicide Prevention Lifeline: 2-459-690-TALK (9-494-770-1636)       For online chat options, visit https://suicidepreventionlifeline.org/chat/  Poison Control Center: 3-818-918-2068  Trans Lifeline: 2-808-378-8447 - Hotline for transgender people of all ages  The Alin Project: 4-595-891-4663 - Hotline for LGBT youth     For Non-Emergency Support:   Fast Tracker: Mental Health & Substance Use Disorder Resources -   https://www.Servion.org/

## 2022-11-10 DIAGNOSIS — F84.0 ACTIVE AUTISTIC DISORDER: ICD-10-CM

## 2022-11-10 DIAGNOSIS — F84.0 AUTISM: ICD-10-CM

## 2022-11-10 RX ORDER — FLUOXETINE 20 MG/5ML
20 SOLUTION ORAL DAILY
Qty: 450 ML | Refills: 0 | Status: SHIPPED | OUTPATIENT
Start: 2022-11-10 | End: 2022-11-17

## 2022-11-10 RX ORDER — LORAZEPAM 2 MG/ML
2 CONCENTRATE ORAL DAILY PRN
Qty: 30 ML | Refills: 0 | Status: SHIPPED | OUTPATIENT
Start: 2022-11-10 | End: 2023-06-29

## 2022-11-10 NOTE — TELEPHONE ENCOUNTER
Bhupinder De La Torre,    Could you reach out to the pharmacy and see what the smallest size bottle is we can send that insurance will approve? They end up wasting so much ativan due to the shelf life and how sparingly they use it.    Thanks in advance,  Mirtha

## 2022-11-10 NOTE — TELEPHONE ENCOUNTER
Okay, Britt. Thanks for double checking. I had a feeling. Just seems so wasteful. I will sign the reorders.     Mirtha

## 2022-11-10 NOTE — TELEPHONE ENCOUNTER
[facility] Call Center    Phone Message    May a detailed message be left on voicemail: yes     Reason for Call: Medication Refill Request    Has the patient contacted the pharmacy for the refill? Yes   Name of medication being requested: FLUoxetine HCl 20 MG/5ML Oral Solution (PROzac)  LORazepam 2 MG/ML Oral Concentrate (LORazepam INTENSOL)  Provider who prescribed the medication: Anjelica Gaming  Pharmacy: [id number] [address]  Date medication is needed: 11/11/22         Action Taken: Other: p midb psychiatry    Travel Screening: Not Applicable

## 2022-11-17 ENCOUNTER — VIRTUAL VISIT (OUTPATIENT)
Dept: PSYCHIATRY | Facility: CLINIC | Age: 22
End: 2022-11-17
Payer: COMMERCIAL

## 2022-11-17 DIAGNOSIS — F84.0 AUTISM: ICD-10-CM

## 2022-11-17 DIAGNOSIS — F84.0 ACTIVE AUTISTIC DISORDER: Primary | ICD-10-CM

## 2022-11-17 PROCEDURE — 99213 OFFICE O/P EST LOW 20 MIN: CPT | Mod: GT | Performed by: NURSE PRACTITIONER

## 2022-11-17 RX ORDER — FLUOXETINE 20 MG/5ML
20 SOLUTION ORAL DAILY
Qty: 450 ML | Refills: 0 | Status: SHIPPED | OUTPATIENT
Start: 2022-11-17 | End: 2023-02-16

## 2022-11-17 NOTE — PATIENT INSTRUCTIONS
**For crisis resources, please see the information at the end of this document**   Patient Education    Thank you for coming to the [facility].     Lab Testing:  If you had lab testing today and your results are reassuring or normal they will be mailed to you or sent through [employer]) within 7 days. If the lab tests need quick action we will call you with the results. The phone number we will call with results is # [phone]. If this is not the best number please call our clinic and change the number.     Medication Refills:  If you need any refills please call your pharmacy and they will contact us. Our fax number for refills is [phone].   Three business days of notice are needed for general medication refill requests.   Five business days of notice are needed for controlled substance refill requests.   If you need to change to a different pharmacy, please contact the new pharmacy directly. The new pharmacy will help you get your medications transferred.     Contact Us:  Please call [phone] during business hours (8-5:00 M-F).   If you have medication related questions after clinic hours, or on the weekend, please call [phone].     Financial Assistance [phone]   Medical Records [phone]       MENTAL HEALTH CRISIS RESOURCES:  For a emergency help, please call 911 or go to the nearest Emergency Department.     Emergency Walk-In Options:   EmPATH Unit @ [place] [facility] ([place]): [phone] - Specialized mental health emergency area designed to be calming  [facility] ([place]): [phone]  [facility] Acute Psych[facility] Services ([place]): [phone]  [facility]): [phone]    [facility] Crisis Information:   [place]: [phone]  [place]: [phone]  [place] ([place]) - Adult: [phone]     Child: [phone]  [place] - Adult: [phone]     Child: [phone]  [place]: [phone]  List of all [place]  [facility] resources:   https://[place].gov/dhs/people-we-serve/adults/health-care/mental-health/resources/crisis-contacts.jsp    National Crisis Information:   Crisis Text Line: Text  [place]  to 437720  Suicide & Crisis Lifeline: 988  National Suicide Prevention Lifeline: 7-173-611-TALK (2-302-459-3734)       For online chat options, visit https://suicidepreventionlifeline.org/chat/  Poison Control Center: 5-138-226-5517  Trans Lifeline: 2-005-874-0960 - Hotline for transgender people of all ages  The Alin Project: 7-648-107-1061 - Hotline for LGBT youth     For Non-Emergency Support:   Fast Tracker: Mental Health & Substance Use Disorder Resources -   https://www.ROBLOXn.org/

## 2022-11-17 NOTE — PROGRESS NOTES
PSYCHIATRY CLINIC PROGRESS NOTE    30 minute medication management   IDENTIFICATION: Estuardo Hernadez is a 22 year old female with previous psychiatric diagnoses of autism. Pt presents for ongoing psychiatric follow-up and was seen for initial diagnostic evaluation on 2/19/2020.  SUBJECTIVE / INTERIM HISTORY     The pt was last seen in clinic 8/18/2022 at which time no medication changes were made. The patient reports good medication adherence. Since the last visit, she has taken her medication daily as prescribed, administered by Mom or PCA's. Mom reports that medication is effective and denies side effects.     SYMPTOMS include ongoing mood stability overall. She continues to get angry when people are talking. Mom notes that the other day she started crying and they were not sure what was going on. She seemed very upset about old biscuits being taken out of her room. Mom thinks this reaction may have been related to her menstrual cycle. She continues to spend more time in her room.     Current Substance Use- none. Sober support- na     MEDICAL ROS          Reports A comprehensive review of systems was performed and is negative other than noted in the HPI.    PAST MEDICATION TRIALS    Prozac, current, moderately effective  Trazodone, in past for sleep  MEDICAL HISTORY      Primary Care Physician: [place] [place] [address] [facility] [place] [address]     Neurologic Hx:  head injury- none     seizure- none      LOC- none    other- na   Patient Active Problem List   Diagnosis     XT (exotropia), intermittent     Hypermetropia     Astigmatism     Autism     ALLERGY       Allergies   Allergen Reactions     Citrus Dermatitis, Diarrhea, GI Disturbance and Other (See Comments)     Dairy Aid [Lactase]        MEDICATIONS      Current Outpatient Medications   Medication Sig     FLUoxetine (PROZAC) 20 MG/5ML solution Take 5 mLs (20 mg) by mouth daily     LORazepam (LORAZEPAM INTENSOL) 2 MG/ML (HIGH CONC)  oral solution Take 1 mL (2 mg) by mouth daily as needed (prior to medical or dental procedures/appointments)     No current facility-administered medications for this visit.       Drug Interaction Check is remarkable for:  None  VITALS    There were no vitals taken for this visit.  LABS  use PSYCHLAB______       none    MENTAL STATUS EXAM     Alertness: alert  and oriented  Appearance: casually groomed  Behavior/Demeanor: calm, with poor eye contact  Speech: Pt did not speak  Language: Pt is mostly non-verbal  Psychomotor: calm  Mood:  description consistent with euthymia  Affect: blunted   Thought Process/Associations: Unable to self-report, was able to follow directions  Thought Content: Pt unable to self-report but no suicidal or homicidal behaviors observed  Perception: Pt unable to self-report but does not appear to be responding to internal stimuli  Insight: limited  Judgment: limited  Cognition: does not appear grossly intact; formal cognitive testing was not done      PSYCHOLOGICAL TESTING:     none    ASSESSMENT     Estuardo Hernadez is a 22 year old female with psychiatric diagnoses of autism. She was engaged briefly at the beginning of the video visit today, but only shortly. She did wave at the camera then left the appointment. Mom reports that things remain stable overall. She would like to keep medication the same today. Follow-up planned for 3 months. Mom to reach out sooner with any questions, concerns, or if an earlier appointment is needed.       TREATMENT RISK STATEMENT:  The risks, benefits, alternatives and potential adverse effects have been explained and are understood by the pt and pt's parent(s)/guardian.  Discussion of specific concerns included- N/A. The  pt and pt's parent(s)/guardian agrees to the treatment plan with the ability to do so. The  pt and pt's parent(s)/guardian knows to call the clinic for any problems or access emergency care if needed. There are no medical  considerations relevant to treatment, as noted above. Substance use is not a problem as noted above.      Drug interaction check was done for any med changes and is discussed above.      DIAGNOSES                                                                                                      Encounter Diagnoses   Name Primary?     Active autistic disorder Yes     Autism                                    PLAN                                                                                                 Medication Plan:         -- Continue Prozac 20 mg PO Q Day                 90 days sent        -- continue lorazepam 2 mg PO Q Day PRN prior to medical or dental procedures                 Refills not needed    Labs:  none    Pt monitor [call for probs]: nothing specific needed    THERAPY: No Change    REFERRALS [CD, medical, other]:  none    :  none    Controlled Substance Contract was not completed    RTC: 3 months    CRISIS NUMBERS: Provided in AVS upon request of patient/guardian.

## 2022-11-17 NOTE — PROGRESS NOTES
Estuardo Hernadez is a 22 year old female who is being evaluated via a billable video visit.        How would you like to obtain your AVS? through [employer]  Primary method for receiving video invitation: [employer]  If the video visit is dropped, the invitation should be resent by: N/A  Will anyone else be joining your video visit? No      Type of service:  Video Visit    Video-Visit Details    Video Start Time: 3:31    Video End Time:3:50  Originating Location (pt. Location): Home    Distant Location (provider location):  remote location    Platform used for Video Visit: Well

## 2023-02-16 ENCOUNTER — VIRTUAL VISIT (OUTPATIENT)
Dept: PSYCHIATRY | Facility: CLINIC | Age: 23
End: 2023-02-16
Payer: COMMERCIAL

## 2023-02-16 DIAGNOSIS — F84.0 ACTIVE AUTISTIC DISORDER: ICD-10-CM

## 2023-02-16 PROCEDURE — 99214 OFFICE O/P EST MOD 30 MIN: CPT | Mod: VID | Performed by: NURSE PRACTITIONER

## 2023-02-16 RX ORDER — FLUOXETINE 20 MG/5ML
20 SOLUTION ORAL DAILY
Qty: 450 ML | Refills: 0 | Status: SHIPPED | OUTPATIENT
Start: 2023-02-16 | End: 2023-05-11

## 2023-02-16 NOTE — PATIENT INSTRUCTIONS
**For crisis resources, please see the information at the end of this document**   Patient Education    Thank you for coming to the [facility].    Lab Testing:  If you had lab testing today and your results are reassuring or normal they will be mailed to you or sent through [employer] within 7 days. If the lab tests need quick action we will call you with the results. The phone number we will call with results is # [phone] (home) . If this is not the best number please call our clinic and change the number.    Medication Refills:  If you need any refills please call your pharmacy and they will contact us. Our fax number for refills is [phone]. Please allow three business for refill processing. If you need to  your refill at a new pharmacy, please contact the new pharmacy directly. The new pharmacy will help you get your medications transferred.     Scheduling:  If you have any concerns about today's visit or wish to schedule another appointment please call our office during normal business hours [phone] (8-5:00 M-F)    Contact Us:  Please call [phone] during business hours (8-5:00 M-F).  If after clinic hours, or on the weekend, please call  [phone].    Financial Assistance [phone]  [facility] Billing [phone]  Central Billing Office, [facility]: [phone]  [facility] Billing [phone]  Medical Records [phone]  [facility] Patient Bill of Rights [email or web address]       MENTAL HEALTH CRISIS NUMBERS:  For a medical emergency please call  911 or go to the nearest ER.     [facility]:   [facility] -[phone]   Crisis Residence [facility] Residence -[phone]   Walk-In Counseling Center [facility] -[phone]   COPE 24/7 [place] Mobile Team -[phone] (adults)/[phone] (child)  CHILD: [facility] needs assessment team - [phone]       [facility]:   [facility] - [phone]   Walk-in counseling [facility] - [phone]   Walk-in counseling [facility] - [phone]   Crisis Residence [facility] Res[facility] - [phone]  Urgent Care Adult Mental Xohlfn-[phone] mobile unit/ 24/7 crisis line    National Crisis Numbers:   National Suicide Prevention Lifeline: 6-890-330-TALK (505-026-6051)  Poison Control Center - 8-706-365-1792  [facility]/resources for a list of additional resources (SOS)  Trans Lifeline a hotline for transgender people 3-167-723-6286  The Alin Project a hotline for LGBT youth 1-528.866.2195  Crisis Text Line: For any crisis 24/7   To: 647233  see www.crisistextline.org  - IF MAKING A CALL FEELS TOO HARD, send a text!         Again thank you for choosing [facility] and please let us know how we can best partner with you to improve you and your family's health.    You may be receiving a survey regarding this appointment. We would love to have your feedback, both positive and negative. The survey is done by an external company, so your answers are anonymous.

## 2023-02-16 NOTE — PROGRESS NOTES
PSYCHIATRY CLINIC PROGRESS NOTE    30 minute medication management   IDENTIFICATION: Estuardo Hernadez is a 22 year old female with previous psychiatric diagnoses of autism. Pt presents for ongoing psychiatric follow-up and was seen for initial diagnostic evaluation on 2/19/2020.  SUBJECTIVE / INTERIM HISTORY     The pt was last seen in clinic 11/17/2022 at which time no medication changes were made. The patient reports good medication adherence. Since the last visit, she has taken her medication daily as prescribed, administered by Mom or PCA's. Mom reports that medication is effective and denies side effects. They have decided to purchase a house instead of building and close in March.     SYMPTOMS include ongoing mood stability per Mom. She states that things are going well. Hoda was aggressive with sister on one occasion during a [employer] game but then later clearly felt remorseful and would come back and kiss her sister or rub where she had scratched her.  She continues to spend a lot of time in her room and prefers quiet. No safety concerns reported today.    Current Substance Use- none. Sober support- na     MEDICAL ROS          Reports A comprehensive review of systems was performed and is negative other than noted in the HPI.    PAST MEDICATION TRIALS    Prozac, current, moderately effective  Trazodone, in past for sleep  MEDICAL HISTORY      Primary Care Physician: Clinic, [facility] at [address] [facility] [place] [address]     Neurologic Hx:  head injury- none     seizure- none      LOC- none    other- na   Patient Active Problem List   Diagnosis     XT (exotropia), intermittent     Hypermetropia     Astigmatism     Autism     ALLERGY       Allergies   Allergen Reactions     Citrus Dermatitis, Diarrhea, GI Disturbance and Other (See Comments)     Dairy Aid [Lactase]        MEDICATIONS      Current Outpatient Medications   Medication Sig     FLUoxetine (PROZAC) 20 MG/5ML solution Take 5  mLs (20 mg) by mouth daily     LORazepam (LORAZEPAM INTENSOL) 2 MG/ML (HIGH CONC) oral solution Take 1 mL (2 mg) by mouth daily as needed (prior to medical or dental procedures/appointments)     No current facility-administered medications for this visit.       Drug Interaction Check is remarkable for:  None  VITALS    There were no vitals taken for this visit.  LABS  use PSYCHLAB______       None    MENTAL STATUS EXAM     Alertness: alert  and oriented  Appearance: casually groomed  Behavior/Demeanor: calm, with poor eye contact  Speech: Pt did not speak  Language: Pt is mostly non-verbal  Psychomotor: calm  Mood:  description consistent with euthymia  Affect: blunted   Thought Process/Associations: Unable to self-report, was able to follow directions  Thought Content: Pt unable to self-report but no suicidal or homicidal behaviors observed  Perception: Pt unable to self-report but does not appear to be responding to internal stimuli  Insight: limited  Judgment: limited  Cognition: does not appear grossly intact; formal cognitive testing was not done      PSYCHOLOGICAL TESTING:     none    ASSESSMENT     Estuardo Hernadez is a 22 year old female with psychiatric diagnoses of autism. She was engaged briefly at the beginning of the video visit today, but only shortly. She did wait for me to say hi before leaving and going back to her room. Mom reports that things remain stable overall. She would like to keep medication the same today. Follow-up planned for 3 months. They will be moving to a new home this Spring/Summer. Mom to reach out sooner with any questions, concerns, or if an earlier appointment is needed.       TREATMENT RISK STATEMENT:  The risks, benefits, alternatives and potential adverse effects have been explained and are understood by the pt and pt's parent(s)/guardian.  Discussion of specific concerns included- N/A. The  pt and pt's parent(s)/guardian agrees to the treatment plan with the  ability to do so. The  pt and pt's parent(s)/guardian knows to call the clinic for any problems or access emergency care if needed. There are no medical considerations relevant to treatment, as noted above. Substance use is not a problem as noted above.      Drug interaction check was done for any med changes and is discussed above.      DIAGNOSES                                                                                                      Encounter Diagnosis   Name Primary?     Active autistic disorder                                    PLAN                                                                                                 Medication Plan:         -- Continue Prozac 20 mg PO Q Day                 90 days sent        -- continue lorazepam 2 mg PO Q Day PRN prior to medical or dental procedures                 Refills not needed    Labs:  none    Pt monitor [call for probs]: nothing specific needed    THERAPY: No Change    REFERRALS [CD, medical, other]:  none    :  none    Controlled Substance Contract was not completed    RTC: 3 months    CRISIS NUMBERS: Provided in AVS upon request of patient/guardian.

## 2023-02-16 NOTE — PROGRESS NOTES
Estuardo Hernadez is a 22 year old female who is being evaluated via a billable video visit.        How would you like to obtain your AVS? through [employer]  Primary method for receiving video invitation: [employer]ev[employer]  If the video visit is dropped, the invitation should be resent by: Call Patient at [phone]  Will anyone else be joining your video visit? No      Type of service:  Video Visit    Video-Visit Details    Video Start Time: 3:00    Video End Time:3:16  Originating Location (pt. Location): Home    Distant Location (provider location):  remote location    Platform used for Video Visit: Yaquelin

## 2023-04-22 ENCOUNTER — HEALTH MAINTENANCE LETTER (OUTPATIENT)
Age: 23
End: 2023-04-22

## 2023-05-11 ENCOUNTER — VIRTUAL VISIT (OUTPATIENT)
Dept: PSYCHIATRY | Facility: CLINIC | Age: 23
End: 2023-05-11
Payer: COMMERCIAL

## 2023-05-11 DIAGNOSIS — F84.0 ACTIVE AUTISTIC DISORDER: ICD-10-CM

## 2023-05-11 DIAGNOSIS — F84.0 AUTISM: Primary | ICD-10-CM

## 2023-05-11 PROCEDURE — 99214 OFFICE O/P EST MOD 30 MIN: CPT | Mod: VID | Performed by: NURSE PRACTITIONER

## 2023-05-11 RX ORDER — FLUOXETINE 20 MG/5ML
30 SOLUTION ORAL DAILY
Qty: 450 ML | Refills: 0 | Status: SHIPPED | OUTPATIENT
Start: 2023-05-11 | End: 2023-05-30

## 2023-05-11 NOTE — PATIENT INSTRUCTIONS
**For crisis resources, please see the information at the end of this document**   Patient Education    Thank you for coming to the [facility].    Lab Testing:  If you had lab testing today and your results are reassuring or normal they will be mailed to you or sent through [employer] within 7 days. If the lab tests need quick action we will call you with the results. The phone number we will call with results is # [phone] (home) . If this is not the best number please call our clinic and change the number.    Medication Refills:  If you need any refills please call your pharmacy and they will contact us. Our fax number for refills is [phone]. Please allow three business for refill processing. If you need to  your refill at a new pharmacy, please contact the new pharmacy directly. The new pharmacy will help you get your medications transferred.     Scheduling:  If you have any concerns about today's visit or wish to schedule another appointment please call our office during normal business hours [phone] (8-5:00 M-F)    Contact Us:  Please call [phone] during business hours (8-5:00 M-F).  If after clinic hours, or on the weekend, please call  [phone].    Financial Assistance [phone]  [facility] Billing [phone]  Central Billing Office, [facility]: [phone]  [facility] Billing [phone]  Medical Records [phone]  [facility] Patient Bill of Rights [email or web address]       MENTAL HEALTH CRISIS NUMBERS:  For a medical emergency please call  911 or go to the nearest ER.     [facility]:   [facility] -[phone]   Crisis Residence [facility] Residence -[phone]   Walk-In Counseling Center [facility] -[phone]   COPE 24/7 [facility] Mobile Team -[phone] (adults)/[phone] (child)  CHILD: [facility] needs assessment team - [phone]       [facility]:   [facility] - [phone]   Walk-in counseling [facility] - [phone]   Walk-in counseling [facility] Family [facility] - [phone]   Crisis Residence [facility] - [phone]  Urgent Care Adult Mental [facility]-[phone] mobile unit/ 24/7 crisis line    National Crisis Numbers:   National Suicide Prevention Lifeline: 8-619-407-TALK (420-071-1116)  Poison Control Center - 8-303-132-4061  Motivating Wellness/resources for a list of additional resources (SOS)  Trans Lifeline a hotline for transgender people 1-779-614-0094  The Alin Project a hotline for LGBT youth 1-237.545.3101  Crisis Text Line: For any crisis 24/7   To: 468537  see www.crisistextline.org  - IF MAKING A CALL FEELS TOO HARD, send a text!         Again thank you for choosing [facility] and please let us know how we can best partner with you to improve you and your family's health.    You may be receiving a survey regarding this appointment. We would love to have your feedback, both positive and negative. The survey is done by an external company, so your answers are anonymous.

## 2023-05-11 NOTE — PROGRESS NOTES
PSYCHIATRY CLINIC PROGRESS NOTE    30 minute medication management   IDENTIFICATION: Estuardo Hernadez is a 23 year old female with previous psychiatric diagnoses of autism. Pt presents for ongoing psychiatric follow-up and was seen for initial diagnostic evaluation on 2/19/2020.  SUBJECTIVE / INTERIM HISTORY     The pt was last seen in clinic 2/16/2023 at which time no medication changes were made. The patient reports good medication adherence. Since the last visit, she has taken her medication daily as prescribed, administered by Mom and/or PCAs staff. They have purchased a new home.     SYMPTOMS include some anxiety related to the new house. Mom reports that when she brought Hoda to the house, she would not go past the entry. She was vocalizing and crying. She has been a bit more aggressive lately as well. She has been spitting on people and pulling hair. Most recently while they were trying to play a game together. She seemed remorseful afterward for days and was tearful at times. She saw the same game on the table and threw it on the floor and spit at the game.    Current Substance Use- none. Sober support- na     MEDICAL ROS          Reports A comprehensive review of systems was performed and is negative other than noted in the HPI..     PAST MEDICATION TRIALS    Prozac, current, moderately effective  Trazodone, in past for sleep  MEDICAL HISTORY      Primary Care Physician: Clinic, [facility] at [address] [place] [id number]     Neurologic Hx:  head injury- none     seizure- none      LOC- none    other- na   Patient Active Problem List   Diagnosis     XT (exotropia), intermittent     Hypermetropia     Astigmatism     Autism     ALLERGY       Allergies   Allergen Reactions     Citrus Dermatitis, Diarrhea, GI Disturbance and Other (See Comments)     Dairy Aid [Tilactase]        MEDICATIONS      Current Outpatient Medications   Medication Sig     FLUoxetine (PROZAC) 20 MG/5ML solution Take  5 mLs (20 mg) by mouth daily     LORazepam (LORAZEPAM INTENSOL) 2 MG/ML (HIGH CONC) oral solution Take 1 mL (2 mg) by mouth daily as needed (prior to medical or dental procedures/appointments)     No current facility-administered medications for this visit.       Drug Interaction Check is remarkable for:  None  VITALS    There were no vitals taken for this visit.  LABS  use PSYCHLAB______       none    MENTAL STATUS EXAM     Alertness: alert  and oriented  Appearance: casually groomed  Behavior/Demeanor: calm, with poor eye contact  Speech: Pt did not speak  Language: Pt is mostly non-verbal  Psychomotor: calm  Mood:  description consistent with euthymia  Affect: blunted   Thought Process/Associations: Unable to self-report, was able to follow directions  Thought Content: Pt unable to self-report but no suicidal or homicidal behaviors observed  Perception: Pt unable to self-report but does not appear to be responding to internal stimuli  Insight: limited  Judgment: limited  Cognition: does not appear grossly intact; formal cognitive testing was not done      PSYCHOLOGICAL TESTING:     none    ASSESSMENT     Estuardo Hernadez is a 23 year old female with psychiatric diagnoses of autism. She was engaged briefly at the beginning of the video visit today, but only shortly. Mom provided most feedback that she has been more on edge and aggressive lately, likely struggling to adjust to the pending move. Plan made to increase prozac to 30 mg daily. Follow-up in 6 weeks. Mom to reach out sooner with any questions, concerns, or if an earlier appointment is needed.       TREATMENT RISK STATEMENT:  The risks, benefits, alternatives and potential adverse effects have been explained and are understood by the pt and pt's parent(s)/guardian.  Discussion of specific concerns included- N/A. The  pt and pt's parent(s)/guardian agrees to the treatment plan with the ability to do so. The  pt and pt's parent(s)/guardian knows to  call the clinic for any problems or access emergency care if needed. There are no medical considerations relevant to treatment, as noted above. Substance use is not a problem as noted above.      Drug interaction check was done for any med changes and is discussed above.      DIAGNOSES                                                                                                      Encounter Diagnosis   Name Primary?     Autism Yes                                   PLAN                                                                                                 Medication Plan:         -- increase prozac to 30 mg PO Q Day   sent        -- continue lorazepam 2 mg PO Q Day PRN prior to medical or dental procedures                 Refills not needed at this time, Mom to reach out    Labs:  none    Pt monitor [call for probs]: nothing specific needed    THERAPY: No Change    REFERRALS [CD, medical, other]:  none    :  none    Controlled Substance Contract was not completed    RTC: 6 weeks    CRISIS NUMBERS: Provided in AVS upon request of patient/guardian.

## 2023-05-11 NOTE — PROGRESS NOTES
Estuardo Hernadez is a 23 year old female who is being evaluated via a billable video visit.        How would you like to obtain your AVS? through [employer]cloud  Primary method for receiving video invitation: Text to cell phone: [phone]  If the video visit is dropped, the invitation should be resent by: Text to cell phone: [phone]  Will anyone else be joining your video visit? No      Type of service:  Video Visit    Video-Visit Details    Video Start Time: 3:07    Video End Time:3:20  Originating Location (pt. Location): Home    Distant Location (provider location):  remote location    Platform used for Video Visit: Well

## 2023-05-30 DIAGNOSIS — F84.0 ACTIVE AUTISTIC DISORDER: ICD-10-CM

## 2023-05-30 RX ORDER — FLUOXETINE 20 MG/5ML
30 SOLUTION ORAL DAILY
Qty: 225 ML | Refills: 0 | Status: SHIPPED | OUTPATIENT
Start: 2023-05-30 | End: 2023-06-29

## 2023-05-30 NOTE — TELEPHONE ENCOUNTER
"Refill request received from: pharmacy    Last appointment: 5/11/2023    RTC: 6 weeks    Canceled appointments: 0    No Showed appointments: 0    Follow up scheduled: 7/3/2023    Requested medication(s) (copy and paste last order information):    Disp Refills Start End PRERNA   FLUoxetine (PROZAC) 20 MG/5ML solution 450 mL 0 5/11/2023  No   Sig - Route: Take 7.5 mLs (30 mg) by mouth daily - Oral   Sent to pharmacy as: FLUoxetine HCl 20 MG/5ML Oral Solution (PROzac)   Class: E-Prescribe   Order: [id number]   E-Prescribing Status: Receipt confirmed by pharmacy (5/11/2023  3:48 PM CDT)         Date medication last filled per outside med information: 5/11/2023 for 30 d/s    Months of medication pended per [facility] refill protocol: 1    Request was sent to [facility] Pool for approval    If patient is due for follow up \"Appointment required for further refills [phone]\" was placed in the sig of the medication and encounter was routed to scheduling pool to encourage follow up.     Medication pended by: Rosibel Fisher CMA    "

## 2023-06-02 ENCOUNTER — HEALTH MAINTENANCE LETTER (OUTPATIENT)
Age: 23
End: 2023-06-02

## 2023-06-29 ENCOUNTER — VIRTUAL VISIT (OUTPATIENT)
Dept: PSYCHIATRY | Facility: CLINIC | Age: 23
End: 2023-06-29
Payer: COMMERCIAL

## 2023-06-29 DIAGNOSIS — F84.0 ACTIVE AUTISTIC DISORDER: ICD-10-CM

## 2023-06-29 DIAGNOSIS — F84.0 AUTISM: Primary | ICD-10-CM

## 2023-06-29 PROCEDURE — 99214 OFFICE O/P EST MOD 30 MIN: CPT | Mod: VID | Performed by: NURSE PRACTITIONER

## 2023-06-29 RX ORDER — LORAZEPAM 2 MG/ML
2 CONCENTRATE ORAL DAILY PRN
Qty: 30 ML | Refills: 0 | Status: SHIPPED | OUTPATIENT
Start: 2023-06-29 | End: 2023-11-27

## 2023-06-29 RX ORDER — FLUOXETINE 20 MG/5ML
40 SOLUTION ORAL DAILY
Qty: 225 ML | Refills: 0 | Status: SHIPPED | OUTPATIENT
Start: 2023-06-29 | End: 2023-08-03

## 2023-06-29 NOTE — PROGRESS NOTES
Virtual Visit Details    Type of service:  Video Visit   Video Start Time: 10:46  Video End Time:11:03    Originating Location (pt. Location): Home    Distant Location (provider location):  Off-site  Platform used for Video Visit: [facility]  PSYCHIATRY CLINIC PROGRESS NOTE    30 minute medication management   IDENTIFICATION: Estuardo Hernadez is a 23 year old female with previous psychiatric diagnoses of  autism. Pt presents for ongoing psychiatric follow-up and was seen for initial diagnostic evaluation on 2/19/2020.  SUBJECTIVE / INTERIM HISTORY     The pt was last seen in clinic 5/11/2023 at which time prozac was increased to 30 mg. The patient reports good medication adherence. Since the last visit, she has taken her medication daily as prescribed, administered by Mom and/or PCAs staff. There continue to be delays with the new house, requesting permits and getting approval for safety measures. Mom is now hoping to be moved in by October.    SYMPTOMS include an increase in perceived mood. She has been coming out more and sitting with staff, more giggly and silly. Pinching and spitting behaviors continues, however. No other safety concerns reported.     Current Substance Use- none. Sober support- na     MEDICAL ROS          Reports A comprehensive review of systems was performed and is negative other than noted in the HPI.    PAST MEDICATION TRIALS    Prozac, current, moderately effective  Trazodone, in past for sleep  MEDICAL HISTORY      Primary Care Physician: Clinic, [facility] at [address] [facility] [place] [address]     Neurologic Hx:  head injury- none     seizure- none      LOC- none    other- na   Patient Active Problem List   Diagnosis     XT (exotropia), intermittent     Hypermetropia     Astigmatism     Autism     ALLERGY       Allergies   Allergen Reactions     Citrus Dermatitis, Diarrhea, GI Disturbance and Other (See Comments)     Dairy Aid [Tilactase]        MEDICATIONS      Current  Outpatient Medications   Medication Sig     FLUoxetine (PROZAC) 20 MG/5ML solution Take 7.5 mLs (30 mg) by mouth daily     LORazepam (LORAZEPAM INTENSOL) 2 MG/ML (HIGH CONC) oral solution Take 1 mL (2 mg) by mouth daily as needed (prior to medical or dental procedures/appointments)     No current facility-administered medications for this visit.       Drug Interaction Check is remarkable for:  None  VITALS    There were no vitals taken for this visit.  LABS  use PSYCHLAB______       none    MENTAL STATUS EXAM     Alertness: alert  and oriented  Appearance: casually groomed  Behavior/Demeanor: calm, with poor eye contact  Speech: Pt did not speak  Language: Pt is mostly non-verbal  Psychomotor: calm  Mood:  description consistent with euthymia  Affect: blunted   Thought Process/Associations: Unable to self-report, was able to follow directions  Thought Content: Pt unable to self-report but no suicidal or homicidal behaviors observed  Perception: Pt unable to self-report but does not appear to be responding to internal stimuli  Insight: limited  Judgment: limited  Cognition: does not appear grossly intact; formal cognitive testing was not done      PSYCHOLOGICAL TESTING:     none     ASSESSMENT     Estuardo Hernadez is a 23 year old female with psychiatric diagnoses of autism. She was engaged briefly at the beginning of the video visit today, but only shortly. Mom provided most feedback that she seems to be in a happier mood since increasing prozac and no side effects have been observed. However, she continues with pinching and spitting behaviors when dysregulated. Will try increasing prozac again to 40 mg daily. Follow-up planned for 2 months. Mom to reach out sooner with any questions, concerns, or if an earlier appointment is needed.       TREATMENT RISK STATEMENT:  The risks, benefits, alternatives and potential adverse effects have been explained and are understood by the pt and pt's parent(s)/guardian.   Discussion of specific concerns included- N/A. The  pt and pt's parent(s)/guardian agrees to the treatment plan with the ability to do so. The  pt and pt's parent(s)/guardian knows to call the clinic for any problems or access emergency care if needed. There are no medical considerations relevant to treatment, as noted above. Substance use is not a problem as noted above.      Drug interaction check was done for any med changes and is discussed above.      DIAGNOSES                                                                                                      Encounter Diagnosis   Name Primary?     Autism Yes                                 PLAN                                                                                                 Medication Plan:         -- increase prozac to 40 mg PO Q Day                 sent        -- continue lorazepam 2 mg PO Q Day PRN prior to medical or dental procedures   sent    Labs:  none    Pt monitor [call for probs]: nothing specific needed    THERAPY: No Change    REFERRALS [CD, medical, other]:  none    :  none    Controlled Substance Contract was not completed    RTC: 2 months    CRISIS NUMBERS: Provided in AVS upon request of patient/guardian.

## 2023-06-29 NOTE — LETTER
2023    Estuardo Hernadez  [address] [facility] [address]  [phone] (home)     :     2000          To Whom it May Concern:    Hoda is seen in clinic for the following diagnoses:  (F84.0) Autism  (primary encounter diagnosis)    Based on the sensory needs, extreme difficulty with activities of daily living (ADL's), personal hygiene, risk of skin infection/poor health related to autism and resistance to showering, it is in Hoda' best interest that she be provided a large  tub for her therapeutic needs.     Please feel free to reach out with any questions, concerns at [phone].    Sincerely,    Signed electronically on 2023 at 11:48 AM    Anjelica Gaming DNP, PMHNP-BC

## 2023-07-17 ENCOUNTER — MYC MEDICAL ADVICE (OUTPATIENT)
Dept: PSYCHIATRY | Facility: CLINIC | Age: 23
End: 2023-07-17
Payer: COMMERCIAL

## 2023-07-17 NOTE — TELEPHONE ENCOUNTER
Totally fine with getting more detailed. Let me know as you start working on it if you have any questions.    Thanks,  Mirtha

## 2023-07-17 NOTE — LETTER
2023    Estuardo Hernadez  [address] [facility] [address]  [phone] (home)     :     2000    To Whom it May Concern:    Hoda is seen in clinic for the following diagnoses:  (F84.0) Autism  (primary encounter diagnosis)    Based on the sensory needs, extreme difficulty with activities of daily living (ADL's), personal hygiene, risk of skin infection/poor health related to autism and resistance to showering, it is in Hoda' best interest that she be provided a large  tub for her therapeutic needs.     Hoda specifically needs the large  tub and floor drain in the bathroom for the following reasons:  - her perineal area is difficult to clean without proper soaking, especially during her menstrual cycle  - her adult body requires a deep tub in order to soak thoroughly  - she will move around and splash in the tub when she is excited, necessitating a floor drain  - being fully immersed in a deep tub meets Hoda' sensory needs, improving her quality of life  - bathing in a large  tub will be a therapeutic experience that results in improved coping with environment, improved mental health, and meeting Hoda' sensory input needs  - sleep patterns are an ongoing challenge for Hoda, bathing in a therapeutic environment will provide the calming needed to improve her sleep    Please feel free to reach out with any questions, concerns at [phone].    Sincerely,    Signed electronically on 2023 at 1211    Anjelica Gaming DNP, PMHNP-BC

## 2023-07-17 NOTE — LETTER
2023    Estuardo Hernadez  [address] [facility] [address]  [phone] (home)     :     2000    To Whom it May Concern:    Hoda is seen in clinic for the following diagnoses:  (F84.0) Autism  (primary encounter diagnosis)    Based on the sensory needs, extreme difficulty with activities of daily living (ADL's), personal hygiene, risk of skin infection/poor health related to autism and resistance to showering, it is in Hoda' best interest that she be provided a large  tub for her therapeutic needs.     Hoda specifically needs the large  tub and floor drain in the bathroom for the following reasons:  - her perineal area is difficult to clean without proper soaking, especially during her menstrual cycle  - her adult body requires a deep tub in order to soak thoroughly  - she will move around and splash in the tub when she is excited, necessitating a floor drain  - being fully immersed in a deep tub meets Hoda' sensory needs, improving her quality of life  - bathing in a large  tub will be a therapeutic experience that results in improved coping with environment, improved mental health, and meeting Hoda' sensory input needs  - sleep patterns are an ongoing challenge for Hoda, bathing in a therapeutic environment will provide the calming needed to improve her sleep    Please feel free to reach out with any questions, concerns at [phone].    Sincerely,    Signed electronically on 2023 at 1211    Anjelica Gaming DNP, PMHNP-BC

## 2023-07-17 NOTE — TELEPHONE ENCOUNTER
Anjelica,     Are you comfortable supporting this ongoing effort to get funding for a deeper tub?  I can draft a letter with the details mother listed in her attachment (which I have copied and pasted below).  I am not totally sure how to make the letters different for each kids but if you support this more detailed letter I can at least set up a separate encounter and take a try at writing two different letters.    I'll wait for your input before proceeding with either letter.    Delores

## 2023-07-17 NOTE — TELEPHONE ENCOUNTER
Recommendations from mom for reasons deeper tub is needed:    IMPORTANT POINTS FOR NEED  Hygiene and behavior - they are not easy to clean, so soaking gives the chance of all parts to get clean in a positive way to make sure bathing stays pleasant and effective.   Hoda will not let you get to certain parts without resistance and its pretty intrusive to force that, nor do I want her to think someone has the right to be forceful in that regard.  Because of incontinence, her private area can get sore and  matted  lack of a better way to describe that so soaking is necessary for us to deal with that.  Kiran it took years to get him in the tub at all without a fight to the finish so he doesn t like much but he will tolerate it and now he wants to soak, which is great, and I think it is because of the sensory benefits.  Since he hates bathing and being washed we do the best we can but because he wears headphones 24/7, shoes and socks 24/7 and his regular clothes 24/7 his skin just doesn t breathe so the dead cells don t fall off so he has like an acne looking skin condition (kind of what homeless people who don t wash get) and he will have body odor, so soaking gives the dead skin a chance to loosen so we can try to exfoliate that--can t do that without soaking.  So, soaking is necessary for his skin health.  Tub Behavior--Hoda puts her foot over the overflow so she can get the water upto the rim to get the most in there.  She will keep the water flowing as well to keep it warm and full.  She uses her foot (either she can t easily with her hand or she doesn t want to get out of the water) to turn it enough to keep some water trickling.  I think she does this to get as much water as possible.  Logic tells me that if the tub is deeper she will have no need to do this.  I plan to paint or rufus a line to help set a guide for her to stop the water fill at a point as well.  Hoda will also swish when excited, so the drain in the  floor is still needed for these bursts of excitement or sensory response.  Kiran swishes by flipping his body side to side I think because he gets cold with the exposed side.  He also does a full body swish where he uses his feet to go up and down the tub when he is excited or as a sensory response, so the drain in the floor is needed for that reason, but Kiran will just suffer in a cold, almost empty tub because he doesn t turn the water on but he wants to stay in there.  He also get a chance to take those head phones off and get his ears and hair wet.  He will put his ears under the water--leaving just his face above, I think because it muffles the sound and he can just relax.  So, I think the deeper tub will give him a chance to get that benefit better and long than he can now.  He won t need to flip like a pancake because he will be under, he will be able to keep his ears under and soak for longer.  Sensory--Hopefully you have the terminology as to how water meets their sensory input needs, supports behaviors management, supports coping with anxiety and stressors, supports mental health, sensory input needs, etc.  So, if you can describe that in detail on a more diagnostic level that would be helpful.  Drowning risks--there is no more a risk than with any other tub, they don t put their face under, ever.  A deeper tub won t change that.  Mobility safety--they don t have physical limitations that way.  Neither one will leave the tub without help--that is just routine since they were little so they just don t do that.  That will wait until someone is standing there and we offer our hand if they want it so they don t slip.  So a deeper tub is not going to be any more of an issue or difference than a regular tub.  Privacy--you may have read that they need full assistance with bathing tasks, but we do step out of the bathroom to give them a chance to have their own privacy too.  Neither one likes to be hovered over.   If I stay in there they just close their eyes and pretend you aren t there until you leave.  It is as if they are annoyed and can t  enjoy  their time until you are gone.  If you stay, the behaviors begin.  So, to keep bathing positive and to give them some privacy and independence we step out and check in often enough or if we hear any water.  Hussein I don t know if you have a more medically supported opinion on that but I believe there is importance and value for them to have some alone time in the tub to soak.  Therapeutic--bathing is one thing as a task for health, but water input and soaking provides a therapeutic support, so the more you can specify that need the better.  So, having a deeper tub addressed the bathing needs as a health issue because of their disability where bathing is a challenge but it also serves a therapeutic need.  I think about their daily lives and a bath to soak in offers them something they can do each day to calm themselves.  I mean medication can only do some and as we know it can t do it all.  So, it is not a lot to ask to give them a tub that can provide this support.  Sleep--lastly, their sleep patterns are a real challenge, so if their bathing experience can be more therapeutically effective, may be their sleep patterns will improve.  Any other you can think of.

## 2023-07-18 NOTE — TELEPHONE ENCOUNTER
Looks good to me. Only added one piece about menstrual cycle. I have signed and routed back to you.    Thanks,  Mirtha

## 2023-07-18 NOTE — TELEPHONE ENCOUNTER
Mirtha,     I made an attempt at this letter.  Please take a look and let me know if there is anything I can change formatting or content quiros.    Delores

## 2023-08-03 ENCOUNTER — MYC MEDICAL ADVICE (OUTPATIENT)
Dept: PSYCHIATRY | Facility: CLINIC | Age: 23
End: 2023-08-03
Payer: COMMERCIAL

## 2023-08-03 DIAGNOSIS — F84.0 ACTIVE AUTISTIC DISORDER: ICD-10-CM

## 2023-08-03 RX ORDER — FLUOXETINE 20 MG/5ML
40 SOLUTION ORAL DAILY
Qty: 300 ML | Refills: 0 | Status: SHIPPED | OUTPATIENT
Start: 2023-08-03 | End: 2023-08-30

## 2023-08-03 NOTE — TELEPHONE ENCOUNTER
Last seen: 6/29  RTC: 2 months  Cancel: none  No-show: none  Next appt: 8/30      Disp Refills Start End PRERNA   FLUoxetine (PROZAC) 20 MG/5ML solution 225 mL 0 6/29/2023  No   Sig - Route: Take 10 mLs (40 mg) by mouth daily - Oral   Sent to pharmacy as: FLUoxetine HCl 20 MG/5ML Oral Solution (PROzac)   Class: E-Prescribe   Order: [id number]   E-Prescribing Status: Receipt confirmed by pharmacy (6/29/2023 11:03 AM CDT)     Last refilled per : 6/19 #[id number]     Medication refill approved per refill protocol.

## 2023-08-30 ENCOUNTER — VIRTUAL VISIT (OUTPATIENT)
Dept: PSYCHIATRY | Facility: CLINIC | Age: 23
End: 2023-08-30
Payer: COMMERCIAL

## 2023-08-30 DIAGNOSIS — F84.0 ACTIVE AUTISTIC DISORDER: ICD-10-CM

## 2023-08-30 PROCEDURE — 99214 OFFICE O/P EST MOD 30 MIN: CPT | Mod: VID | Performed by: NURSE PRACTITIONER

## 2023-08-30 RX ORDER — FLUOXETINE 20 MG/5ML
40 SOLUTION ORAL DAILY
Qty: 300 ML | Refills: 2 | Status: SHIPPED | OUTPATIENT
Start: 2023-08-30 | End: 2023-11-27

## 2023-08-30 ASSESSMENT — PAIN SCALES - GENERAL: PAINLEVEL: NO PAIN (0)

## 2023-08-30 NOTE — PROGRESS NOTES
Virtual Visit Details    Type of service:  Video Visit   Video Start Time:  2:01  Video End Time: 2:37    Originating Location (pt. Location): Home    Distant Location (provider location):  Off-site  Platform used for Video Visit: [facility]  PSYCHIATRY CLINIC PROGRESS NOTE    30 minute medication management   IDENTIFICATION: Estuardo Hernadez is a 23 year old female with previous psychiatric diagnoses of  autism. Pt presents for ongoing psychiatric follow-up and was seen for initial diagnostic evaluation on 2/19/2020.   SUBJECTIVE / INTERIM HISTORY     The pt was last seen in clinic 6/29/2023 at which time prozac was increased to 40 mg daily. The patient reports good medication adherence. Since the last visit, she has taken her medication daily as prescribed, administered by Mom and/or PC[facility] staff. No side effects have been observed. The interior of the house should be done in the next couple weeks and Hoda will move in before her brother. Mom is looking into making their new home a group home but is meeting a lot of resistance and licensing issues with the state.    SYMPTOMS include continued increase in interacting with family and staff. Mom notes that she seems to be more expressive, even when upset or frustrated. There was one occasion in which she was clearly upset with staff for moving her off a chair to get her out of the way from brother who was upset. She is more cuddly as well. She is spitting and pinching less. She does continue to spit at people who are talking to Mom. Mom thinks this may be related to her not wanting people interrupting. They are now working on ignoring the spitting or limiting the attention she gets afterward to reinforce other forms of communication. No safety concerns reported today.    Current Substance Use- none. Sober support- na     MEDICAL ROS          Reports A comprehensive review of systems was performed and is negative other than noted above.     PAST MEDICATION TRIALS     Prozac, current, moderately effective  Trazodone, in past for sleep  MEDICAL HISTORY      Primary Care Physician: Clinic, [facility] at [address] [facility] [address]     Neurologic Hx:  head injury- none     seizure- none      LOC- none    other- na   Patient Active Problem List   Diagnosis    XT (exotropia), intermittent    Hypermetropia    Astigmatism    Autism     ALLERGY       Allergies   Allergen Reactions    Citrus Dermatitis, Diarrhea, GI Disturbance and Other (See Comments)    Dairy Aid [Tilactase]        MEDICATIONS      Current Outpatient Medications   Medication Sig    FLUoxetine (PROZAC) 20 MG/5ML solution Take 10 mLs (40 mg) by mouth daily    LORazepam (LORAZEPAM INTENSOL) 2 MG/ML (HIGH CONC) oral solution Take 1 mL (2 mg) by mouth daily as needed (prior to medical or dental procedures/appointments)     No current facility-administered medications for this visit.       Drug Interaction Check is remarkable for:  None  VITALS    There were no vitals taken for this visit.  LABS  use PSYCHLAB______       none    MENTAL STATUS EXAM     Alertness: alert  and oriented  Appearance: casually groomed  Behavior/Demeanor: calm, with poor eye contact  Speech: Pt did not speak  Language: Pt is mostly non-verbal  Psychomotor: calm  Mood:  description consistent with euthymia  Affect: blunted   Thought Process/Associations: Unable to self-report, was able to follow directions  Thought Content: Pt unable to self-report but no suicidal or homicidal behaviors observed  Perception: Pt unable to self-report but does not appear to be responding to internal stimuli  Insight: limited  Judgment: limited  Cognition: does not appear grossly intact; formal cognitive testing was not done      PSYCHOLOGICAL TESTING:     none    ASSESSMENT     Estuardo Hernadez is a 23 year old female with psychiatric diagnoses of autism. She was engaged briefly today. Mom provided most feedback. She seems to be interacting  more and overall more bright since Prozac was increased. Mom would like to keep medications the same today. Follow-up planned for 3 months. Mom to reach out sooner with any questions, concerns, or if an earlier appointment is needed.       TREATMENT RISK STATEMENT:  The risks, benefits, alternatives and potential adverse effects have been explained and are understood by the pt and pt's parent(s)/guardian.  Discussion of specific concerns included- N/A. The  pt and pt's parent(s)/guardian agrees to the treatment plan with the ability to do so. The  pt and pt's parent(s)/guardian knows to call the clinic for any problems or access emergency care if needed. There are no medical considerations relevant to treatment, as noted above. Substance use is not a problem as noted above.      Drug interaction check was done for any med changes and is discussed above.      DIAGNOSES                                                                                                      Encounter Diagnosis   Name Primary?    Active autistic disorder                                    PLAN                                                                                                 Medication Plan:         -- continue prozac 40 mg PO Q Day  sent        -- continue lorazepam 2 mg PO Q Day PRN prior to medical or dental procedures    Sent at last visit, using sparingly, not sent    Labs:  none    Pt monitor [call for probs]: nothing specific needed    THERAPY: No Change    REFERRALS [CD, medical, other]:  none    :  none    Controlled Substance Contract was not completed    RTC: 3 months    CRISIS NUMBERS: Provided in AVS upon request of patient/guardian.

## 2023-08-30 NOTE — NURSING NOTE
Is the patient currently in the state of [place]? YES    Visit mode:VIDEO    If the visit is dropped, the patient can be reconnected by: VIDEO VISIT: Send to e-mail at: [email or web address]    Dinorah will be at visit with patient.    Will anyone else be joining the visit? NO  (If patient encounters technical issues they should call [phone])    How would you like to obtain your AVS? MyChart    Are changes needed to the allergy or medication list? Pt stated no changes to allergies and Pt stated no med changes    Reason for visit: JAIDEN ROSARIOF

## 2023-10-13 ENCOUNTER — MYC MEDICAL ADVICE (OUTPATIENT)
Dept: PSYCHIATRY | Facility: CLINIC | Age: 23
End: 2023-10-13
Payer: MEDICARE

## 2023-10-16 NOTE — TELEPHONE ENCOUNTER
Mirtha,     Same ask here as with Kiran but I will wait for mom's clarification and update you when I have an understanding of what is needed.    Delores

## 2023-10-18 ENCOUNTER — TRANSFERRED RECORDS (OUTPATIENT)
Dept: HEALTH INFORMATION MANAGEMENT | Facility: CLINIC | Age: 23
End: 2023-10-18
Payer: MEDICARE

## 2023-11-27 DIAGNOSIS — F84.0 AUTISM: ICD-10-CM

## 2023-11-27 DIAGNOSIS — F84.0 ACTIVE AUTISTIC DISORDER: ICD-10-CM

## 2023-11-27 RX ORDER — FLUOXETINE 20 MG/5ML
40 SOLUTION ORAL DAILY
Qty: 300 ML | Refills: 2 | Status: SHIPPED | OUTPATIENT
Start: 2023-11-27 | End: 2024-01-22

## 2023-11-27 RX ORDER — LORAZEPAM 2 MG/ML
2 CONCENTRATE ORAL DAILY PRN
Qty: 30 ML | Refills: 0 | Status: SHIPPED | OUTPATIENT
Start: 2023-11-27 | End: 2024-01-22

## 2023-11-27 NOTE — TELEPHONE ENCOUNTER
Mirtha,     Per our new protocol, 90 d/s is pended here for the fluoxetine.       for lorazepam:        It does make sense that they may need lorazepam again soon since teeth cleaning is every 6 months.    Patient does have an appointment in December if you feel you need to wait to discuss the lorazepam.    Delores

## 2023-11-27 NOTE — TELEPHONE ENCOUNTER
Refill request received from: parent/guardian    Last appointment: 08/30/23    RTC: 3 months     Canceled appointments: 11/29/23    No Showed appointments: 0    Follow up scheduled: 12/13/23    Requested medication(s) (copy and paste last order information):     Disp Refills Start End PRERNA    FLUoxetine (PROZAC) 20 MG/5ML solution 300 mL 2 8/30/2023  No   Sig - Route: Take 10 mLs (40 mg) by mouth daily - Oral   Sent to pharmacy as: FLUoxetine HCl 20 MG/5ML Oral Solution (PROzac)   Class: E-Prescribe   Order: [id number]   E-Prescribing Status: Receipt confirmed by pharmacy (8/30/2023  3:25 PM CDT)       Disp Refills Start End PRERNA    LORazepam (LORAZEPAM INTENSOL) 2 MG/ML (HIGH CONC) oral solution 30 mL 0 6/29/2023  No   Sig - Route: Take 1 mL (2 mg) by mouth daily as needed (prior to medical or dental procedures/appointments) - Oral   Sent to pharmacy as: LORazepam 2 MG/ML Oral Concentrate (LORazepam INTENSOL)   Class: E-Prescribe   Order: [id number]   E-Prescribing Status: Receipt confirmed by pharmacy (6/29/2023 11:03 AM CDT)       Date medication last filled per outside med information:   Fluoxetine filled 11/02/23 for 30 d/s   Lorazepam filled 06/29/23 for 30 d/s       Months of medication pended per [facility] refill protocol: 1 month     Request was sent to [facility] for approval

## 2023-11-27 NOTE — TELEPHONE ENCOUNTER
[facility] Call Center    Phone Message    May a detailed message be left on voicemail: yes     Reason for Call: Medication Refill Request    Has the patient contacted the pharmacy for the refill? Yes   Name of medication being requested:     FLUoxetine (PROZAC) 20 MG/5ML solution     LORazepam (LORAZEPAM INTENSOL) 2 MG/ML (HIGH CONC) oral solution     Provider who prescribed the medication: Anjelica Gaming NP   Pharmacy:   [id number] [place] [address] [address]     Date medication is needed: 11/27/23       Action Taken: Other: p [facility] psychiatry    Travel Screening: Not Applicable

## 2023-12-13 ENCOUNTER — TELEPHONE (OUTPATIENT)
Dept: PSYCHIATRY | Facility: CLINIC | Age: 23
End: 2023-12-13
Payer: MEDICARE

## 2023-12-13 NOTE — TELEPHONE ENCOUNTER
[facility] Call Center    Phone Message    May a detailed message be left on voicemail: yes     Reason for Call: Medication Refill Request    Has the patient contacted the pharmacy for the refill? Yes   Name of medication being requested: FLUoxetine (PROZAC) 20 MG/5ML solution   Provider who prescribed the medication: Anjelica Gaming NP   Pharmacy:   [id number] - [facility] [address]     Date medication is needed: 12/18   *Guardian stated patient has few days of medication left     Action Taken: Other: [facility] PSYCHIATRY    Travel Screening: Not Applicable

## 2024-01-22 ENCOUNTER — VIRTUAL VISIT (OUTPATIENT)
Dept: PSYCHIATRY | Facility: CLINIC | Age: 24
End: 2024-01-22
Payer: COMMERCIAL

## 2024-01-22 VITALS — HEIGHT: 61 IN | WEIGHT: 160 LBS | BODY MASS INDEX: 30.21 KG/M2

## 2024-01-22 DIAGNOSIS — F84.0 AUTISM: Primary | ICD-10-CM

## 2024-01-22 DIAGNOSIS — F84.0 ACTIVE AUTISTIC DISORDER: ICD-10-CM

## 2024-01-22 PROCEDURE — 99214 OFFICE O/P EST MOD 30 MIN: CPT | Mod: 95 | Performed by: NURSE PRACTITIONER

## 2024-01-22 RX ORDER — LORAZEPAM 2 MG/ML
2 CONCENTRATE ORAL DAILY PRN
Qty: 30 ML | Refills: 0 | Status: SHIPPED | OUTPATIENT
Start: 2024-01-22

## 2024-01-22 RX ORDER — FLUOXETINE 20 MG/5ML
40 SOLUTION ORAL DAILY
Qty: 300 ML | Refills: 2 | Status: SHIPPED | OUTPATIENT
Start: 2024-01-22 | End: 2024-03-20

## 2024-01-22 NOTE — NURSING NOTE
Is the patient currently in the state of [place]? YES    Visit mode:VIDEO    If the visit is dropped, the patient can be reconnected by: VIDEO VISIT: Text to cell phone:   Telephone Information:   Mobile [phone]       Will anyone else be joining the visit? NO  (If patient encounters technical issues they should call [phone]    How would you like to obtain your AVS? MyChart    Are changes needed to the allergy or medication list? No    Reason for visit: RECHECK    Charisma COE

## 2024-01-22 NOTE — PROGRESS NOTES
Virtual Visit Details    Type of service:  Video Visit   Video Start Time:  1:50  Video End Time: 2:03    Originating Location (pt. Location): Home    Distant Location (provider location):  Off-site  Platform used for Video Visit: [facility]      PSYCHIATRY CLINIC PROGRESS NOTE    30 minute medication management   IDENTIFICATION: Jessica Hernadez is a 23 year old female with previous psychiatric diagnoses of autism. Pt presents for ongoing psychiatric follow-up and was seen for initial diagnostic evaluation on 2/19/2020.    SUBJECTIVE / INTERIM HISTORY     The pt was last seen in clinic 8/30/2023 at which time no medication changes were made. The patient reports good medication adherence. Since the last visit, he has taken her medication daily as prescribed, administered by Mom and/or [facility] staff. No side effects have been observed. They are averaging about 0.4-0.5 mL of ativan once per day.     SYMPTOMS include an increase in emotional lability. She goes between angry, sad/crying, and happy. She has been pinching/picking at skin again and spitting. After the first week or so at the new house she started to get upset and packed up her belongings at the new house. She has been more on edge since starting to realize she is not moving back to the new house. Sleep schedule has shifted some and she is staying up later at night.     Current Substance Use- none. Sober support- na     MEDICAL ROS          Reports A comprehensive review of systems was performed and is negative other than noted above.    PAST MEDICATION TRIALS    Prozac, current, moderately effective  Trazodone, in past for sleep  MEDICAL HISTORY      Primary Care Physician: Clinic, [facility] at [address] [place] [id number]     Neurologic Hx:  head injury- none     seizure- none      LOC- none    other- na   Patient Active Problem List   Diagnosis    XT (exotropia), intermittent    Hypermetropia    Astigmatism    Autism     ALLERGY      "  Allergies   Allergen Reactions    Citrus Dermatitis, Diarrhea, GI Disturbance and Other (See Comments)    Dairy Aid [Tilactase]        MEDICATIONS      Current Outpatient Medications   Medication Sig    FLUoxetine (PROZAC) 20 MG/5ML solution Take 10 mLs (40 mg) by mouth daily    LORazepam (LORAZEPAM INTENSOL) 2 MG/ML (HIGH CONC) oral solution Take 1 mL (2 mg) by mouth daily as needed (prior to medical or dental procedures/appointments)     No current facility-administered medications for this visit.       Drug Interaction Check is remarkable for:  None  VITALS    There were no vitals taken for this visit.  LABS  use PSYCHLAB______       none    MENTAL STATUS EXAM     Alertness: alert  and oriented  Appearance: casually groomed  Behavior/Demeanor: calm, with poor eye contact  Speech: Pt did not speak  Language: Pt is mostly non-verbal  Psychomotor: calm  Mood:  \"all over the place\" per Mom  Affect: blunted   Thought Process/Associations: Unable to self-report, was able to follow directions  Thought Content: Pt unable to self-report but no suicidal or homicidal behaviors observed  Perception: Pt unable to self-report but does not appear to be responding to internal stimuli  Insight: limited  Judgment: limited  Cognition: does not appear grossly intact; formal cognitive testing was not done      PSYCHOLOGICAL TESTING:     none    ASSESSMENT     Jessica Hernadez is a 23 year old female with psychiatric diagnoses of autism. She was engaged briefly today. Mom provided most feedback. She seems to be more on edge since moving to the new house. Family has been relying on ativan about once per day for agitation. Reiterated long term plan to reduce reliance on ativan as they continue to settle into the new house. Follow-up planned for 3 months. Mom to reach out sooner if Hoda struggles to settle into her new environment.           TREATMENT RISK STATEMENT:  The risks, benefits, alternatives and potential adverse " effects have been explained and are understood by the pt and pt's parent(s)/guardian.  Discussion of specific concerns included- N/A. The  pt and pt's parent(s)/guardian agrees to the treatment plan with the ability to do so. The  pt and pt's parent(s)/guardian knows to call the clinic for any problems or access emergency care if needed. There are no medical considerations relevant to treatment, as noted above. Substance use is not a problem as noted above.      Drug interaction check was done for any med changes and is discussed above.      DIAGNOSES                                                                                                      Encounter Diagnosis   Name Primary?    Autism Yes                                   PLAN                                                                                                 Medication Plan:         -- continue prozac 40 mg PO Q Day  sent        -- continue lorazepam 2 mg PO Q Day PRN prior to medical or dental procedures                  Sent    Labs:  none    Pt monitor [call for probs]: nothing specific needed    THERAPY: No Change    REFERRALS [CD, medical, other]:  none    :  none    Controlled Substance Contract was not completed    RTC: 3 months    CRISIS NUMBERS: Provided in AVS upon request of patient/guardian.

## 2024-01-29 ENCOUNTER — APPOINTMENT (OUTPATIENT)
Dept: CT IMAGING | Facility: CLINIC | Age: 24
End: 2024-01-29
Attending: EMERGENCY MEDICINE
Payer: COMMERCIAL

## 2024-01-29 ENCOUNTER — HOSPITAL ENCOUNTER (EMERGENCY)
Facility: CLINIC | Age: 24
Discharge: HOME | End: 2024-01-29
Attending: EMERGENCY MEDICINE | Admitting: EMERGENCY MEDICINE
Payer: COMMERCIAL

## 2024-01-29 ENCOUNTER — MEDICAL CORRESPONDENCE (OUTPATIENT)
Dept: HEALTH INFORMATION MANAGEMENT | Facility: CLINIC | Age: 24
End: 2024-01-29

## 2024-01-29 VITALS
HEART RATE: 114 BPM | OXYGEN SATURATION: 94 % | TEMPERATURE: 98.1 F | RESPIRATION RATE: 18 BRPM | DIASTOLIC BLOOD PRESSURE: 71 MMHG | SYSTOLIC BLOOD PRESSURE: 121 MMHG

## 2024-01-29 DIAGNOSIS — S09.90XA CLOSED HEAD INJURY, INITIAL ENCOUNTER: ICD-10-CM

## 2024-01-29 DIAGNOSIS — R56.9 SEIZURE (H): ICD-10-CM

## 2024-01-29 LAB
ANION GAP SERPL CALCULATED.3IONS-SCNC: 12 MMOL/L (ref 7–15)
BASOPHILS # BLD AUTO: 0.1 10E3/UL (ref 0–0.2)
BASOPHILS NFR BLD AUTO: 1 %
BUN SERPL-MCNC: 8 MG/DL (ref 6–20)
CALCIUM SERPL-MCNC: 9 MG/DL (ref 8.6–10)
CHLORIDE SERPL-SCNC: 100 MMOL/L (ref 98–107)
CREAT SERPL-MCNC: 0.51 MG/DL (ref 0.51–0.95)
DEPRECATED HCO3 PLAS-SCNC: 21 MMOL/L (ref 22–29)
EGFRCR SERPLBLD CKD-EPI 2021: >90 ML/MIN/1.73M2
EOSINOPHIL # BLD AUTO: 0.2 10E3/UL (ref 0–0.7)
EOSINOPHIL NFR BLD AUTO: 3 %
ERYTHROCYTE [DISTWIDTH] IN BLOOD BY AUTOMATED COUNT: 12.9 % (ref 10–15)
GLUCOSE SERPL-MCNC: 115 MG/DL (ref 70–99)
HCT VFR BLD AUTO: 38.4 % (ref 35–47)
HGB BLD-MCNC: 12.5 G/DL (ref 11.7–15.7)
HOLD SPECIMEN: NORMAL
IMM GRANULOCYTES # BLD: 0.1 10E3/UL
IMM GRANULOCYTES NFR BLD: 1 %
LYMPHOCYTES # BLD AUTO: 2.5 10E3/UL (ref 0.8–5.3)
LYMPHOCYTES NFR BLD AUTO: 31 %
MCH RBC QN AUTO: 29 PG (ref 26.5–33)
MCHC RBC AUTO-ENTMCNC: 32.6 G/DL (ref 31.5–36.5)
MCV RBC AUTO: 89 FL (ref 78–100)
MONOCYTES # BLD AUTO: 0.5 10E3/UL (ref 0–1.3)
MONOCYTES NFR BLD AUTO: 7 %
NEUTROPHILS # BLD AUTO: 4.5 10E3/UL (ref 1.6–8.3)
NEUTROPHILS NFR BLD AUTO: 57 %
NRBC # BLD AUTO: 0 10E3/UL
NRBC BLD AUTO-RTO: 0 /100
PLATELET # BLD AUTO: 374 10E3/UL (ref 150–450)
POTASSIUM SERPL-SCNC: 3.7 MMOL/L (ref 3.4–5.3)
RBC # BLD AUTO: 4.31 10E6/UL (ref 3.8–5.2)
SODIUM SERPL-SCNC: 133 MMOL/L (ref 135–145)
TROPONIN T SERPL HS-MCNC: <6 NG/L
WBC # BLD AUTO: 7.9 10E3/UL (ref 4–11)

## 2024-01-29 PROCEDURE — 70450 CT HEAD/BRAIN W/O DYE: CPT

## 2024-01-29 PROCEDURE — 96360 HYDRATION IV INFUSION INIT: CPT

## 2024-01-29 PROCEDURE — 84484 ASSAY OF TROPONIN QUANT: CPT | Performed by: EMERGENCY MEDICINE

## 2024-01-29 PROCEDURE — 85025 COMPLETE CBC W/AUTO DIFF WBC: CPT | Performed by: EMERGENCY MEDICINE

## 2024-01-29 PROCEDURE — 258N000003 HC RX IP 258 OP 636: Performed by: EMERGENCY MEDICINE

## 2024-01-29 PROCEDURE — 36415 COLL VENOUS BLD VENIPUNCTURE: CPT | Performed by: EMERGENCY MEDICINE

## 2024-01-29 PROCEDURE — 80048 BASIC METABOLIC PNL TOTAL CA: CPT | Performed by: EMERGENCY MEDICINE

## 2024-01-29 PROCEDURE — 93005 ELECTROCARDIOGRAM TRACING: CPT

## 2024-01-29 PROCEDURE — 99285 EMERGENCY DEPT VISIT HI MDM: CPT | Mod: 25

## 2024-01-29 PROCEDURE — 96361 HYDRATE IV INFUSION ADD-ON: CPT

## 2024-01-29 RX ADMIN — SODIUM CHLORIDE 1000 ML: 9 INJECTION, SOLUTION INTRAVENOUS at 17:41

## 2024-01-29 ASSESSMENT — ACTIVITIES OF DAILY LIVING (ADL): ADLS_ACUITY_SCORE: 35

## 2024-01-29 NOTE — ED TRIAGE NOTES
Pt BIBA MH at around 1630 mother head a fall in kitchen where pt fell off kitchen stool and was rigid and had some foaming at the mouth.  Pt was given 0.5 ativan  which she has been taking for agitation.  Pt is of lower cognitive function and non verbal. She does not have seizure hx      Triage Assessment (Adult)       Row Name 01/29/24 4922          Triage Assessment    Airway WDL WDL        Respiratory WDL    Respiratory WDL WDL        Skin Circulation/Temperature WDL    Skin Circulation/Temperature WDL WDL        Cardiac WDL    Cardiac WDL WDL        Peripheral/Neurovascular WDL    Peripheral Neurovascular WDL WDL        Cognitive/Neuro/Behavioral WDL    Cognitive/Neuro/Behavioral WDL WDL  pt nonverbal lower cognative fuction

## 2024-01-29 NOTE — ED PROVIDER NOTES
History     Chief Complaint:  Seizures       The history is provided by a parent.      Haylee Hernadez is a nonverbal 23 year old female with a history of autism who presents via EMS with seizures. Patient's mother reports patient was sitting on a barstool at home around 1630, when she heard a crash from another room and found patient lying face down on the floor. Patient was rigid, shaking, eyes rolled back, and foaming at the mouth. This lasted a few minutes until police arrived, and patient was breathing, but unresponsive. She is concerned for a head injury as she fell backwards off the stool. She has been moving her head spontaneously. Denies fever, rash, swelling, urinary changes, or infectious symptoms. Her mother is her primary caretaker, and states patient spends a lot of time alone in her room, and is able to use the bathroom on her own. She is unsure if she has been having unwitnessed seizures due to her being alone often. She has difficulty expressing pain. She has been agitated in the last month, due to adjusting to moving to a new home. She has been taking lorazepam, but did not take any today. She also takes fluoxetine, but there have not been any medication changes. Denies personal or family history of seizures.     Independent Historian:   Mother, per ANGELA.    Review of External Notes:   Outpatient clinic notes reviewed. Virtual visit from 1/22/24 reviewed - ongoing psychiatry follow-up    Medications:    Fluoxetine  Lorazepam    Past Medical History:    Autism  Nonverbal   Astigmatism   Hypermetria   Exotropia intermittent    Physical Exam   Patient Vitals for the past 24 hrs:   BP Temp Temp src Pulse Resp SpO2   01/29/24 1759 -- 98.1  F (36.7  C) Axillary -- -- --   01/29/24 1713 121/71 -- -- 114 18 94 %        Physical Exam  General: Adult female, laying on the stretcher  Eyes: PERRL, Conjunctive within normal limits.  Strabismus present.  HENT: No scalp tenderness or palpable  hematoma.  Moist mucous membranes, oropharynx clear.  Tongue is normal in appearance.  Dentition intact.  Neck: No apparent tenderness to palpation.  Normal spontaneous range of motion.  CV: Normal S1S2, no murmur, rub or gallop. Regular rate and rhythm  Resp: Clear to auscultation bilaterally, no wheezes, rales or rhonchi. Normal respiratory effort.  GI: Abdomen is soft, with no apparent tenderness and nondistended.  No palpable masses. No rebound or guarding.  MSK: No edema. Nontender. Normal spontaneous range of motion.  Skin: Warm and dry.  Superficial breakage of capillaries and a linear fashion over the left forearm and right forearm.  Neuro: Alert.  Will follow some commands when asked by mother.  Nonverbal.  Psych: Calm.    Emergency Department Course   ECG  ECG taken at 1720, ECG read at 1726  Sinus tachycardia  T wave abnormality, consider anterior ischemia    No prior for comparison  Rate 119 bpm. CO interval 132 ms. QRS duration 76 ms. QT/QTc 330/464 ms. P-R-T axes 33 21 13.     Imaging:  CT Head w/o Contrast   Final Result   IMPRESSION:   1.  No acute intracranial process.         Laboratory:  Labs Ordered and Resulted from Time of ED Arrival to Time of ED Departure   BASIC METABOLIC PANEL - Abnormal       Result Value    Sodium 133 (*)     Potassium 3.7      Chloride 100      Carbon Dioxide (CO2) 21 (*)     Anion Gap 12      Urea Nitrogen 8.0      Creatinine 0.51      GFR Estimate >90      Calcium 9.0      Glucose 115 (*)    TROPONIN T, HIGH SENSITIVITY - Normal    Troponin T, High Sensitivity <6     CBC WITH PLATELETS AND DIFFERENTIAL    WBC Count 7.9      RBC Count 4.31      Hemoglobin 12.5      Hematocrit 38.4      MCV 89      MCH 29.0      MCHC 32.6      RDW 12.9      Platelet Count 374      % Neutrophils 57      % Lymphocytes 31      % Monocytes 7      % Eosinophils 3      % Basophils 1      % Immature Granulocytes 1      NRBCs per 100 WBC 0      Absolute Neutrophils 4.5      Absolute Lymphocytes  2.5      Absolute Monocytes 0.5      Absolute Eosinophils 0.2      Absolute Basophils 0.1      Absolute Immature Granulocytes 0.1      Absolute NRBCs 0.0        Emergency Department Course & Assessments:  Interventions:  Medications   sodium chloride 0.9% BOLUS 1,000 mL (1,000 mLs Intravenous $New Bag 1/29/24 1741)      Independent Interpretation (X-rays, CTs, rhythm strip):  I reviewed the patient's head CT.  I do not see evidence of intracranial hemorrhage or skull fracture.    Assessments/Consultations/Discussion of Management or Tests:   ED Course as of 01/29/24 1921 Mon Jan 29, 2024 1741 I evaluated the patient and obtained history as noted above.   Reassessed the patient.  She continues to be at baseline per mother.  I reassessed the patient.  She is taking p.o. without vomiting.  Mother feels comfortable to plan for discharge home.    Social Determinants of Health affecting care:   None    Disposition:  The patient was discharged.     Impression & Plan    Medical Decision Making:  Jessica Hernadez is a 23-year-old female history of autism who is nonverbal who presents emergency department with concerns for seizure-like activity.  By her mother's description it does sound consistent with probable seizure.  She had no further seizure-like activity here.  There is concern for trauma based on fall from a stool and therefore head CT was obtained but did not show any acute intracranial pathology including hemorrhage or mass.  No other findings that were concerning for trauma on evaluation here.  She is afebrile with no current signs of infection.  She is taking p.o. without vomiting.  She is at baseline throughout her stay.  I discussed plan of care with mother as well as follow-up with PCP and neurology for further assessment.  Recurrence of seizure-like activity should prompt return to the emergency department for further evaluation.  Mother is also cautioned if there are any new symptoms that  develop that she is concerned with she should return for assessment again.  All questions were answered at this time the patient is thought appropriate for further evaluation as outpatient.  All questions answered prior to discharge.    Diagnosis:    ICD-10-CM    1. Seizure (H)  R56.9       2. Closed head injury, initial encounter  S09.90XA          Discharge Medications:  New Prescriptions    No medications on file      Scribe Disclosure:  Erinn VILA, am serving as a scribe at 5:58 PM on 1/29/2024 to document services personally performed by Lien Rhodes MD based on my observations and the provider's statements to me.    1/29/2024   Lien Rhodes MD Jonkman, Tracy Dianne, MD  01/29/24 2786

## 2024-01-30 LAB
ATRIAL RATE - MUSE: 119 BPM
DIASTOLIC BLOOD PRESSURE - MUSE: NORMAL MMHG
INTERPRETATION ECG - MUSE: NORMAL
P AXIS - MUSE: 33 DEGREES
PR INTERVAL - MUSE: 132 MS
QRS DURATION - MUSE: 76 MS
QT - MUSE: 330 MS
QTC - MUSE: 464 MS
R AXIS - MUSE: 21 DEGREES
SYSTOLIC BLOOD PRESSURE - MUSE: NORMAL MMHG
T AXIS - MUSE: 13 DEGREES
VENTRICULAR RATE- MUSE: 119 BPM

## 2024-01-30 NOTE — DISCHARGE INSTRUCTIONS
Discharge Instructions  First Time Seizure (Convulsion)    You have had a spell that may have been a seizure. Many other things can look like a seizure, including a fainting spell, a migraine, psychological issues, and other movement disorders. It can often be hard to know with certainty whether you had a seizure. Your evaluation today is likely not be complete and you may need further testing and evaluation from a neurologist (brain specialist).    Of people who have a seizure, most never have another one. For that reason, anti-seizure medicines are not started in most cases after a first seizure. If you have risk factors for seizures, medication may be started after a first seizure. People are not usually kept in the hospital after a seizure.    During a seizure, there is abnormal and excessive electrical activity in the brain. This can cause changes in awareness, behavior, and abnormal shaking or jerking movements.  This activity usually lasts only a few seconds to minutes. The period following a seizure is called the postictal state. During this time, you may be confused, tired, and you may develop a throbbing headache. Some people develop a brief period of difficulty speaking or experience temporary vision loss, numbness, or weakness.    Generally, every Emergency Department visit should have a follow-up clinic visit with either a primary or a specialty clinic/provider. Please follow-up as instructed by your emergency provider today.    Return to the Emergency Department if:   You have another seizure.  You develop a fever over 100.4 F.  You feel much more ill, or develop new symptoms like severe headache.  You have trouble walking, seeing, or develop weakness or numbness in your arms or legs.     What can I do to help myself?  Do not drive until you have been rechecked by your provider and have been told it is safe to drive.  If you have a seizure while driving you may cause a motor vehicle accident with  injury or death to yourself or others.   Do not swim, climb ladders, or do anything else that would be dangerous if you had another seizure or spell of loss of consciousness, until you are cleared by your provider.    Check your state driving requirements for patients with seizures on the [employer]ile[employer] Website at www.epilepsyfoundation.org/resources/drivingandtravel.cfm.  Do not drink alcohol.  Drinking alcohol increases the risk of seizures and can interfere with the effect of anti-seizure medications.  Take any medication prescribed for you exactly as directed, at the right times, and at the right doses.    If you were given a prescription for medicine here today, be sure to read all of the information (including the package insert) that comes with your prescription.  This will include important information about the medicine, its side effects, and any warnings that you need to know about.  The pharmacist who fills the prescription can provide more information and answer questions you may have about the medicine.  If you have questions or concerns that the pharmacist cannot address, please call or return to the Emergency Department.     Remember that you can always come back to the Emergency Department if you are not able to see your regular provider in the amount of time listed above, if you get any new symptoms, or if there is anything that worries you.     Discharge Instructions  Head Injury    You have been seen today for a head injury. Your evaluation included a history and physical examination. You may have had a CT (CAT) scan performed, though most head injuries do not require a scan. Based on this evaluation, your provider today does not feel that your head injury is serious.    Generally, every Emergency Department visit should have a follow-up clinic visit with either a primary or a specialty clinic/provider. Please follow-up as instructed by your emergency provider today.  Return to the Emergency  Department if:  You are confused or you are not acting right.  Your headache gets worse or you start to have a really bad headache even with your recommended treatment plan.  You vomit (throw up) more than once.  You have a seizure.  You have trouble walking.  You have weakness or paralysis (cannot move) in an arm or a leg.  You have blood or fluid coming from your ears or nose.  You have new symptoms or anything that worries you.    Sleeping:  It is okay for you to sleep, but someone should wake you up if instructed by your provider, and someone should check on you at your usual time to wake up.     Activity:  Do not drive for at least 24 hours.  Do not drive if you have dizzy spells or trouble concentrating, or remembering things.  Do not return to any contact sports until cleared by your regular provider.     MORE INFORMATION:    Concussion:  A concussion is a minor head injury that may cause temporary problems with the way the brain works. Although concussions are important, they are generally not an emergency or a reason that a person needs to be hospitalized. Some concussion symptoms include confusion, amnesia (forgetful), nausea (sick to your stomach) and vomiting (throwing up), dizziness, fatigue, memory or concentration problems, irritability and sleep problems. For most people, concussions are mild and temporary but some will have more severe and persistent symptoms that require on-going care and treatment.  CT Scans: Your evaluation today may have included a CT scan (CAT scan) to look for things like bleeding or a skull fracture (broken bone).  CT scans involve radiation and too many CT scans can cause serious health problems like cancer, especially in children.  Because of this, your provider may not have ordered a CT scan today if they think you are at low risk for a serious or life threatening problem.    If you were given a prescription for medicine here today, be sure to read all of the information  (including the package insert) that comes with your prescription.  This will include important information about the medicine, its side effects, and any warnings that you need to know about.  The pharmacist who fills the prescription can provide more information and answer questions you may have about the medicine.  If you have questions or concerns that the pharmacist cannot address, please call or return to the Emergency Department.     Remember that you can always come back to the Emergency Department if you are not able to see your regular provider in the amount of time listed above, if you get any new symptoms, or if there is anything that worries you.

## 2024-02-26 DIAGNOSIS — F84.0 ACTIVE AUTISTIC DISORDER: ICD-10-CM

## 2024-02-27 DIAGNOSIS — F84.0 ACTIVE AUTISTIC DISORDER: ICD-10-CM

## 2024-02-27 RX ORDER — FLUOXETINE 20 MG/5ML
SOLUTION ORAL
Qty: 300 ML | Refills: 2 | OUTPATIENT
Start: 2024-02-27

## 2024-02-27 RX ORDER — FLUOXETINE 20 MG/5ML
SOLUTION ORAL
Qty: 225 ML | OUTPATIENT
Start: 2024-02-27

## 2024-02-27 NOTE — TELEPHONE ENCOUNTER
Refill request for fluoxetine DENIED.  Updated RX sent to pharmacy in January with 3 months of medication access.    Cassi Richards RN

## 2024-02-27 NOTE — TELEPHONE ENCOUNTER
Refill request received from: pharmacy e-prescribe    Last appointment: 1/22/2024    RTC: 3 months    Canceled appointments: 0    No Showed appointments: 0    Follow up scheduled: 4/22/2024    Requested medication(s) (copy and paste last order information):       Disp Refills Start End PRERNA    FLUoxetine (PROZAC) 20 MG/5ML solution 300 mL 2 1/22/2024 -- No   Sig - Route: Take 10 mLs (40 mg) by mouth daily - Oral   Sent to pharmacy as: FLUoxetine HCl 20 MG/5ML Oral Solution (PROzac)   Class: E-Prescribe   Order: [id number]   E-Prescribing Status: Receipt confirmed by pharmacy (1/22/2024  2:11 PM CST)       Date medication last filled per outside med information: 1/29/2024 for 30 d/s    Months of medication pended per MID refill protocol: 0    Patient should have enough refills on file to reach next appointment.

## 2024-03-20 ENCOUNTER — MYC REFILL (OUTPATIENT)
Dept: PSYCHIATRY | Facility: CLINIC | Age: 24
End: 2024-03-20
Payer: COMMERCIAL

## 2024-03-20 DIAGNOSIS — F84.0 ACTIVE AUTISTIC DISORDER: ICD-10-CM

## 2024-03-20 NOTE — TELEPHONE ENCOUNTER
Last seen: 1/22  RTC: 3 months   Cancel: none  No-show: none  Next appt: 4/22     Incoming refill from patient via MyChart     FLUoxetine (PROZAC) 20 MG/5ML solution 300 mL 2 1/22/2024 -- No   Sig - Route: Take 10 mLs (40 mg) by mouth daily - Oral   Last refilled: not listed, 30 d/s with two refills sent on 1/22

## 2024-03-21 RX ORDER — FLUOXETINE 20 MG/5ML
40 SOLUTION ORAL DAILY
Qty: 300 ML | Refills: 2 | Status: SHIPPED | OUTPATIENT
Start: 2024-03-21 | End: 2024-04-22

## 2024-04-08 ENCOUNTER — LAB (OUTPATIENT)
Dept: LAB | Facility: CLINIC | Age: 24
End: 2024-04-08
Payer: COMMERCIAL

## 2024-04-08 DIAGNOSIS — R56.9 GENERALIZED-ONSET SEIZURES (H): ICD-10-CM

## 2024-04-08 DIAGNOSIS — Z79.899 NEED FOR PROPHYLACTIC CHEMOTHERAPY: ICD-10-CM

## 2024-04-08 DIAGNOSIS — R56.9 GENERALIZED-ONSET SEIZURES (H): Primary | ICD-10-CM

## 2024-04-08 PROCEDURE — 36415 COLL VENOUS BLD VENIPUNCTURE: CPT

## 2024-04-08 PROCEDURE — 80175 DRUG SCREEN QUAN LAMOTRIGINE: CPT | Mod: 90

## 2024-04-08 PROCEDURE — 99000 SPECIMEN HANDLING OFFICE-LAB: CPT

## 2024-04-09 LAB — LAMOTRIGINE SERPL-MCNC: 4.4 UG/ML

## 2024-04-20 ENCOUNTER — HEALTH MAINTENANCE LETTER (OUTPATIENT)
Age: 24
End: 2024-04-20

## 2024-04-22 ENCOUNTER — VIRTUAL VISIT (OUTPATIENT)
Dept: PSYCHIATRY | Facility: CLINIC | Age: 24
End: 2024-04-22
Payer: COMMERCIAL

## 2024-04-22 DIAGNOSIS — F84.0 ACTIVE AUTISTIC DISORDER: ICD-10-CM

## 2024-04-22 DIAGNOSIS — F84.0 AUTISM: Primary | ICD-10-CM

## 2024-04-22 DIAGNOSIS — G40.909 SEIZURE DISORDER (H): ICD-10-CM

## 2024-04-22 PROCEDURE — G2211 COMPLEX E/M VISIT ADD ON: HCPCS | Mod: 95 | Performed by: NURSE PRACTITIONER

## 2024-04-22 PROCEDURE — 99213 OFFICE O/P EST LOW 20 MIN: CPT | Mod: 95 | Performed by: NURSE PRACTITIONER

## 2024-04-22 RX ORDER — LAMOTRIGINE 100 MG/1
100 TABLET ORAL 2 TIMES DAILY
COMMUNITY
Start: 2024-04-21

## 2024-04-22 RX ORDER — LAMOTRIGINE 25 MG/1
50 TABLET ORAL 2 TIMES DAILY
COMMUNITY
Start: 2024-04-12

## 2024-04-22 RX ORDER — FLUOXETINE 20 MG/5ML
40 SOLUTION ORAL DAILY
Qty: 300 ML | Refills: 2 | Status: SHIPPED | OUTPATIENT
Start: 2024-04-22 | End: 2024-07-22

## 2024-04-22 ASSESSMENT — PAIN SCALES - GENERAL: PAINLEVEL: NO PAIN (0)

## 2024-04-22 NOTE — NURSING NOTE
Is the patient currently in the state of [place]? YES    Visit mode:VIDEO    If the visit is dropped, the patient can be reconnected by: VIDEO VISIT: Text to cell phone:   Telephone Information:   Mobile [phone]       Will anyone else be joining the visit? NO  (If patient encounters technical issues they should call [phone]    How would you like to obtain your AVS? MyChart    Are changes needed to the allergy or medication list? No    Are refills needed on medications prescribed by this physician? unsure    Reason for visit: JAIDEN COE

## 2024-04-22 NOTE — PROGRESS NOTES
Virtual Visit Details    Type of service:  Video Visit   Video Start Time:  1:55  Video End Time: 2:18    Originating Location (pt. Location): Home    Distant Location (provider location):  Off-site  Platform used for Video Visit: [facility]  PSYCHIATRY CLINIC PROGRESS NOTE    30 minute medication management   IDENTIFICATION: Haylee Hernadez is a 24 year old female with previous psychiatric diagnoses of autism. Pt presents for ongoing psychiatric follow-up and was seen for initial diagnostic evaluation on 2/19/2020.    SUBJECTIVE / INTERIM HISTORY     The pt was last seen in clinic 1/22/2024 at which time no medication changes were made. The patient reports good medication adherence. Since the last visit, she has taken her medication daily as prescribed. She has not needed lorazepam since January. No side effects have been observed. Medical updates: she has had a seizure and is now being seen by neurology. She has been confirmed to be having seizures and has started on lamotrigine.     SYMPTOMS include improvements in mood. Mom thinks she has adjusted to the new house well. She is tolerating seizure medication as well and this seems to be improving her mood as well. She is more giggly and playful. No safety concerns reported.     Current Substance Use- none. Sober support- na     MEDICAL ROS          Reports A comprehensive review of systems was performed and is negative other than noted above.    PAST MEDICATION TRIALS    Prozac, current, moderately effective  Trazodone, in past for sleep  MEDICAL HISTORY      Primary Care Physician: Clinic, [facility] at [address] [place] [id number]     Neurologic Hx:  head injury- none     seizure- none      LOC- none    other- na   Patient Active Problem List   Diagnosis    XT (exotropia), intermittent    Hypermetropia    Astigmatism    Autism     ALLERGY       Allergies   Allergen Reactions    Citrus Dermatitis, Diarrhea, GI Disturbance and Other (See  "Comments)    Dairy Aid [Tilactase]        MEDICATIONS      Current Outpatient Medications   Medication Sig Dispense Refill    FLUoxetine (PROZAC) 20 MG/5ML solution Take 10 mLs (40 mg) by mouth daily 300 mL 2    lamoTRIgine (LAMICTAL) 100 MG tablet Take 100 mg by mouth 2 times daily Along with 2 tabs of the 25 mg (total 150 mg). Crushed and put into liquid.      lamoTRIgine (LAMICTAL) 25 MG tablet Take 50 mg by mouth 2 times daily Along with 1 tab of the 100 mg (total 150 mg). Crushed and put into liquid.      LORazepam (LORAZEPAM INTENSOL) 2 MG/ML (HIGH CONC) oral solution Take 1 mL (2 mg) by mouth daily as needed (prior to medical or dental procedures/appointments) 30 mL 0     No current facility-administered medications for this visit.       Drug Interaction Check is remarkable for:  none  VITALS    There were no vitals taken for this visit.  LABS  [facility]______       Lab on 04/08/2024   Component Date Value Ref Range Status    Lamotrigine 04/08/2024 4.4  3.0 - 15.0 ug/mL Final    INTERPRETIVE INFORMATION:  Lamotrigine    Therapeutic Range:  3.0-15.0 ug/mL              Toxic:  Greater than or equal to 20 ug/mL     Pharmacokinetics varies widely, particularly with   co-medications and/or compromised renal function.  Adverse   effects may include dizziness, somnolence, nausea and   vomiting.  Performed By: [facility]  [address] [place] [address]  : Doc Felton MD, PhD  IA Number: [id number]       MENTAL STATUS EXAM     Alertness: alert  and oriented  Appearance: casually groomed  Behavior/Demeanor: calm, with poor eye contact  Speech: Pt did not speak  Language: Pt is mostly non-verbal  Psychomotor: calm  Mood:  \"okay\" per Mom  Affect: blunted   Thought Process/Associations: Unable to self-report, was able to follow directions  Thought Content: Pt unable to self-report but no suicidal or homicidal behaviors observed  Perception: Pt unable to self-report but " does not appear to be responding to internal stimuli  Insight: limited  Judgment: limited  Cognition: does not appear grossly intact; formal cognitive testing was not done      PSYCHOLOGICAL TESTING:     none    ASSESSMENT     Haylee Hernadez is a 24 year old female with psychiatric diagnoses of autism. She was engaged briefly today. Mom provided most feedback. She seems to have settled into the house. Her mood is improving since starting lamotrigine and better managing seizures. No changes to medication today. Follow-up planned for 3 months. Mom to reach out sooner with any questions, concerns, or if an earlier appointment is needed.         The longitudinal plan of care for autism were addressed during this visit. Due to the added complexity in care, I will continue to support Hoda in the subsequent management of this condition(s) and with the ongoing continuity of care of this condition(s).      6}       TREATMENT RISK STATEMENT:  The risks, benefits, alternatives and potential adverse effects have been explained and are understood by the pt and pt's parent(s)/guardian.  Discussion of specific concerns included- N/A. The  pt and pt's parent(s)/guardian agrees to the treatment plan with the ability to do so. The  pt and pt's parent(s)/guardian knows to call the clinic for any problems or access emergency care if needed. There are medical considerations relevant to treatment, as noted above. Substance use is not a problem as noted above.      Drug interaction check was done for any med changes and is discussed above.      DIAGNOSES                                                                                                      Encounter Diagnoses   Name Primary?    Autism Yes    Seizure disorder (H)                                    PLAN                                                                                                 Medication Plan:         -- continue prozac 40 mg PO Q Day  sent         -- continue lorazepam 2 mg PO Q Day PRN prior to medical or dental procedures                  use has reduced, not needed    Labs:  none    Pt monitor [call for probs]: nothing specific needed    THERAPY: No Change    REFERRALS [CD, medical, other]:  none    :  none    Controlled Substance Contract was not completed    RTC: 3 months    CRISIS NUMBERS: Provided in AVS upon request of patient/guardian.

## 2024-05-08 ENCOUNTER — LAB (OUTPATIENT)
Dept: LAB | Facility: CLINIC | Age: 24
End: 2024-05-08
Payer: COMMERCIAL

## 2024-05-08 DIAGNOSIS — R56.9 GENERALIZED-ONSET SEIZURES (H): Primary | ICD-10-CM

## 2024-05-08 DIAGNOSIS — Z79.899 DRUG THERAPY: ICD-10-CM

## 2024-05-08 PROCEDURE — 80175 DRUG SCREEN QUAN LAMOTRIGINE: CPT

## 2024-05-08 PROCEDURE — 36415 COLL VENOUS BLD VENIPUNCTURE: CPT

## 2024-05-09 LAB — LAMOTRIGINE SERPL-MCNC: 5 UG/ML

## 2024-06-11 DIAGNOSIS — F84.0 ACTIVE AUTISTIC DISORDER: ICD-10-CM

## 2024-06-11 RX ORDER — FLUOXETINE 20 MG/5ML
SOLUTION ORAL
Qty: 300 ML | Refills: 2 | OUTPATIENT
Start: 2024-06-11

## 2024-06-11 NOTE — TELEPHONE ENCOUNTER
Refill request received from: pharmacy e-prescribe    Last appointment: 4/22/2024    RTC: 3 months    Canceled appointments: 0    No Showed appointments: 0    Follow up scheduled: 7/22/2024    Requested medication(s) (copy and paste last order information):     Disp Refills Start End PRERNA    FLUoxetine (PROZAC) 20 MG/5ML solution 300 mL 2 4/22/2024 -- No   Sig - Route: Take 10 mLs (40 mg) by mouth daily - Oral   Sent to pharmacy as: FLUoxetine HCl 20 MG/5ML Oral Solution (PROzac)   Class: E-Prescribe   Order: [id number]   E-Prescribing Status: Receipt confirmed by pharmacy (4/22/2024  2:25 PM CDT)       Date medication last filled per outside med information: 5/14/2024 for 30 d/s    Months of medication pended per [facility] refill protocol: 0    Patient should still have refills on file.

## 2024-07-22 ENCOUNTER — VIRTUAL VISIT (OUTPATIENT)
Dept: PSYCHIATRY | Facility: CLINIC | Age: 24
End: 2024-07-22
Payer: COMMERCIAL

## 2024-07-22 DIAGNOSIS — F84.0 ACTIVE AUTISTIC DISORDER: ICD-10-CM

## 2024-07-22 DIAGNOSIS — F84.0 AUTISM: Primary | ICD-10-CM

## 2024-07-22 DIAGNOSIS — G40.909 SEIZURE DISORDER (H): ICD-10-CM

## 2024-07-22 PROCEDURE — G2211 COMPLEX E/M VISIT ADD ON: HCPCS | Mod: 95 | Performed by: NURSE PRACTITIONER

## 2024-07-22 PROCEDURE — 99214 OFFICE O/P EST MOD 30 MIN: CPT | Mod: 95 | Performed by: NURSE PRACTITIONER

## 2024-07-22 RX ORDER — FLUOXETINE 20 MG/5ML
40 SOLUTION ORAL DAILY
Qty: 300 ML | Refills: 2 | Status: SHIPPED | OUTPATIENT
Start: 2024-07-22

## 2024-07-22 NOTE — NURSING NOTE
Current patient location: [address] [place] [address] DR JONES [facility] [address]    Is the patient currently in the state of [place]? YES    Visit mode:VIDEO    If the visit is dropped, the patient can be reconnected by: VIDEO VISIT: Text to cell phone:   Telephone Information:   Mobile [phone]       Will anyone else be joining the visit? NO  (If patient encounters technical issues they should call [phone])    How would you like to obtain your AVS? MyChart    Are changes needed to the allergy or medication list? No    Are refills needed on medications prescribed by this physician? NO    Reason for visit: RECHECK    Charisma COE

## 2024-07-22 NOTE — PROGRESS NOTES
Virtual Visit Details    Type of service:  Video Visit   Video Start Time:  1:50  Video End Time: 2:09    Originating Location (pt. Location): Home    Distant Location (provider location):  Off-site  Platform used for Video Visit: [facility]      PSYCHIATRY CLINIC PROGRESS NOTE    30 minute medication management   IDENTIFICATION: Haylee Hernadez is a 24 year old female with previous psychiatric diagnoses of autism. Pt presents for ongoing psychiatric follow-up and was seen for initial diagnostic evaluation on 2/19/2020.    SUBJECTIVE / INTERIM HISTORY     The pt was last seen in clinic 4/22/24 at which time no medication changes were made. The patient reports good medication adherence. Since the last visit, she has taken her medication daily as prescribed. No side effects have been observed. She continues to be managed for seizures and has had an increase in lamotrigine. This improved her EEG but there was still seizure activity. She has seen ObGyn. They discussed birth control options given her ongoing struggles with her menstrual cycle. They have not needed to use the PRN lorazepam.     SYMPTOMS include continued distress related to Mom talking. She does not like when there is too much noise in the house. Mom notes that overall she is doing well transitioning to the new house. Recently, she called out to her mom with vocalization from the bathroom for assistance which was the first time she had ever done this. She has been screaming and sometimes pinching/spitting. She will sometimes try to get attention by kicking items or dropping items on purpose.     Current Substance Use- none. Sober support- na     MEDICAL ROS          Reports A comprehensive review of systems was performed and is negative other than noted above.    PAST MEDICATION TRIALS    Prozac, current, moderately effective  Trazodone, in past for sleep  MEDICAL HISTORY      Primary Care Physician: Marck, [facility] at [address] [place] [address]  [place]  [place] [address]     Neurologic Hx:  head injury- none     seizure- seizures continues but is managed by neurology      LOC- none    other- na   Patient Active Problem List   Diagnosis    XT (exotropia), intermittent    Hypermetropia    Astigmatism    Autism     ALLERGY       Allergies   Allergen Reactions    Citrus Dermatitis, Diarrhea, GI Disturbance and Other (See Comments)    Dairy Aid [Tilactase]        MEDICATIONS      Current Outpatient Medications   Medication Sig Dispense Refill    FLUoxetine (PROZAC) 20 MG/5ML solution Take 10 mLs (40 mg) by mouth daily 300 mL 2    lamoTRIgine (LAMICTAL) 100 MG tablet Take 100 mg by mouth 2 times daily Along with 2 tabs of the 25 mg (total 150 mg). Crushed and put into liquid.      lamoTRIgine (LAMICTAL) 25 MG tablet Take 50 mg by mouth 2 times daily Along with 1 tab of the 100 mg (total 150 mg). Crushed and put into liquid.      LORazepam (LORAZEPAM INTENSOL) 2 MG/ML (HIGH CONC) oral solution Take 1 mL (2 mg) by mouth daily as needed (prior to medical or dental procedures/appointments) 30 mL 0     No current facility-administered medications for this visit.       Drug Interaction Check is remarkable for:  None  VITALS    There were no vitals taken for this visit.  LABS  use PSYCHLAB______       Lab on 05/08/2024   Component Date Value Ref Range Status    Lamotrigine 05/08/2024 5.0  3.0 - 15.0 ug/mL Final    INTERPRETIVE INFORMATION:  Lamotrigine    Therapeutic Range:  3.0-15.0 ug/mL              Toxic:  Greater than or equal to 20 ug/mL     Pharmacokinetics varies widely, particularly with   co-medications and/or compromised renal function.  Adverse   effects may include dizziness, somnolence, nausea and   vomiting.  Performed By: [employer]  [address] [place] [id number]  : Doc Felton MD, PhD  CLIA Number: [id number]         MENTAL STATUS EXAM     Alertness: alert  and oriented  Appearance: casually  "groomed  Behavior/Demeanor: calm, with poor eye contact  Speech: Pt did not speak  Language: Pt is mostly non-verbal  Psychomotor: calm  Mood:  \"okay\" per Mom  Affect: blunted   Thought Process/Associations: Unable to self-report, was able to follow directions  Thought Content: Pt unable to self-report but no suicidal or homicidal behaviors observed  Perception: Pt unable to self-report but does not appear to be responding to internal stimuli  Insight: limited  Judgment: limited  Cognition: does not appear grossly intact; formal cognitive testing was not done      PSYCHOLOGICAL TESTING:     none    ASSESSMENT     Haylee Hernadez is a 24 year old female with psychiatric diagnoses of autism. She was engaged briefly today. Mom provided most feedback. She has been doing well overall. Mom would like to keep medications the same today as they continue to work on optimizing her seizure medications. No changes made today. Follow-up planned for 3 months. Mom to reach out sooner with any other questions, concerns, or if an earlier appointment is needed.        I was present with the student Leeanna Cerna, who participated in the service and in the documentation of the note. I have verified the history and personally performed the psychiatric exam and medical decision-making. I agree with the assessment and plan of care as documented in the note.     The longitudinal plan of care for autism were addressed during this visit. Due to the added complexity in care, I will continue to support Hoda in the subsequent management of this condition(s) and with the ongoing continuity of care of this condition(s).          TREATMENT RISK STATEMENT:  The risks, benefits, alternatives and potential adverse effects have been explained and are understood by the pt and pt's parent(s)/guardian.  Discussion of specific concerns included- N/A. The  pt and pt's parent(s)/guardian agrees to the treatment plan with the ability to do so. The  " pt and pt's parent(s)/guardian knows to call the clinic for any problems or access emergency care if needed. There are medical considerations relevant to treatment, as noted above. Substance use is not a problem as noted above.      Drug interaction check was done for any med changes and is discussed above.      DIAGNOSES                                                                                                      Encounter Diagnoses   Name Primary?    Autism Yes    Seizure disorder (H)                                  PLAN                                                                                                 Medication Plan:         -- continue prozac 40 mg PO Q Day  sent        -- continue lorazepam 2 mg PO Q Day PRN prior to medical or dental procedures                  use has reduced, not needed    Labs:  none    Pt monitor [call for probs]: nothing specific needed    THERAPY: No Change    REFERRALS [CD, medical, other]:  none    :  none    Controlled Substance Contract was not completed    RTC: 3 months    CRISIS NUMBERS: Provided in AVS upon request of patient/guardian.

## 2024-09-04 ENCOUNTER — LAB (OUTPATIENT)
Dept: LAB | Facility: CLINIC | Age: 24
End: 2024-09-04
Payer: COMMERCIAL

## 2024-09-04 DIAGNOSIS — Z79.899 NEED FOR PROPHYLACTIC CHEMOTHERAPY: ICD-10-CM

## 2024-09-04 DIAGNOSIS — R56.9 GENERALIZED-ONSET SEIZURES (H): Primary | ICD-10-CM

## 2024-09-04 PROCEDURE — 36415 COLL VENOUS BLD VENIPUNCTURE: CPT

## 2024-09-04 PROCEDURE — 80175 DRUG SCREEN QUAN LAMOTRIGINE: CPT

## 2024-09-05 LAB — LAMOTRIGINE SERPL-MCNC: 10.8 UG/ML

## 2024-10-21 ENCOUNTER — VIRTUAL VISIT (OUTPATIENT)
Dept: PSYCHIATRY | Facility: CLINIC | Age: 24
End: 2024-10-21
Payer: COMMERCIAL

## 2024-10-21 VITALS — HEIGHT: 61 IN | BODY MASS INDEX: 31.15 KG/M2 | WEIGHT: 165 LBS

## 2024-10-21 DIAGNOSIS — F84.0 ACTIVE AUTISTIC DISORDER: ICD-10-CM

## 2024-10-21 DIAGNOSIS — F84.0 AUTISM: ICD-10-CM

## 2024-10-21 PROCEDURE — G2211 COMPLEX E/M VISIT ADD ON: HCPCS | Mod: 95 | Performed by: NURSE PRACTITIONER

## 2024-10-21 PROCEDURE — 99214 OFFICE O/P EST MOD 30 MIN: CPT | Mod: 95 | Performed by: NURSE PRACTITIONER

## 2024-10-21 RX ORDER — FLUOXETINE 20 MG/5ML
40 SOLUTION ORAL DAILY
Qty: 300 ML | Refills: 2 | Status: SHIPPED | OUTPATIENT
Start: 2024-10-21

## 2024-10-21 RX ORDER — LAMOTRIGINE 25 MG/1
200 TABLET, CHEWABLE ORAL 2 TIMES DAILY
COMMUNITY
Start: 2024-09-01

## 2024-10-21 RX ORDER — LORAZEPAM 2 MG/ML
2 CONCENTRATE ORAL DAILY PRN
Qty: 30 ML | Refills: 0 | Status: CANCELLED | OUTPATIENT
Start: 2024-10-21

## 2024-10-21 ASSESSMENT — PAIN SCALES - GENERAL: PAINLEVEL: NO PAIN (0)

## 2024-10-21 NOTE — PROGRESS NOTES
Virtual Visit Details    Type of service:  Video Visit   Video Start Time:  1:48  Video End Time: 1:59    Originating Location (pt. Location): Home    Distant Location (provider location):  Off-site  Platform used for Video Visit: [facility]  PSYCHIATRY CLINIC PROGRESS NOTE    30 minute medication management   IDENTIFICATION: Haylee Hernadez is a 24 year old female with previous psychiatric diagnoses of autism. Pt presents for ongoing psychiatric follow-up and was seen for initial diagnostic evaluation on 2/19/2020.    SUBJECTIVE / INTERIM HISTORY     The pt was last seen in clinic 7/22/24 at which time no medication changes were made. The patient reports good medication adherence. Since the last visit, she has taken her medication daily as prescribed, overseen by parent and PCA. She is drowsy from her seizure medications but tolerating well overall.    SYMPTOMS include overall improvements in adjusting to the new house. Mom is working on developing new routines in the new house, especially trying to get Hoda out of the house for a bit. She will likely bring her to the zoo. She is still easily very annoyed by noise in the house. Spitting is reduced. She will still kick the walls to get attention. No safety concerns reported.     Current Substance Use- none. Sober support- na     MEDICAL ROS          Reports A comprehensive review of systems was performed and is negative other than noted above.     PAST MEDICATION TRIALS    Prozac, current, moderately effective  Trazodone, in past for sleep  MEDICAL HISTORY      Primary Care Physician: [facility], [facility] at [address] [facility] [address]     Neurologic Hx:  head injury- none     seizure- none      LOC- none    other- na   Patient Active Problem List   Diagnosis    XT (exotropia), intermittent    Hypermetropia    Astigmatism    Autism     ALLERGY       Allergies   Allergen Reactions    Citrus Dermatitis, Diarrhea, GI Disturbance and Other (See  "Comments)    Dairy Aid [Tilactase]        MEDICATIONS      Current Outpatient Medications   Medication Sig Dispense Refill    FLUoxetine (PROZAC) 20 MG/5ML solution Take 10 mLs (40 mg) by mouth daily 300 mL 2    lamoTRIgine (LAMICTAL) 25 MG chewable tablet       LORazepam (LORAZEPAM INTENSOL) 2 MG/ML (HIGH CONC) oral solution Take 1 mL (2 mg) by mouth daily as needed (prior to medical or dental procedures/appointments) 30 mL 0    lamoTRIgine (LAMICTAL) 100 MG tablet Take 100 mg by mouth 2 times daily Along with 2 tabs of the 25 mg (total 150 mg). Crushed and put into liquid.      lamoTRIgine (LAMICTAL) 25 MG tablet Take 50 mg by mouth 2 times daily Along with 1 tab of the 100 mg (total 150 mg). Crushed and put into liquid.       No current facility-administered medications for this visit.       Drug Interaction Check is remarkable for:    VITALS    Ht 1.549 m (5' 1\")   Wt 74.8 kg (165 lb)   BMI 31.18 kg/m    LABS  use PSYCHLAB______       Lab on 09/04/2024   Component Date Value Ref Range Status    Lamotrigine 09/04/2024 10.8  3.0 - 15.0 ug/mL Final    INTERPRETIVE INFORMATION:  Lamotrigine    Therapeutic Range:  3.0-15.0 ug/mL              Toxic:  Greater than or equal to 20 ug/mL     Pharmacokinetics varies widely, particularly with   co-medications and/or compromised renal function.  Adverse   effects may include dizziness, somnolence, nausea and   vomiting.  Performed By: [employer]  [address] [place] [id number]  : Doc Felton MD, PhD  CLIA Number: [id number]         MENTAL STATUS EXAM     Alertness: alert  and oriented  Appearance: casually groomed  Behavior/Demeanor: calm, with poor eye contact  Speech: Pt did not speak  Language: Pt is mostly non-verbal  Psychomotor: calm  Mood:  \"okay\" per Mom  Affect: blunted   Thought Process/Associations: Unable to self-report, was able to follow directions  Thought Content: Pt unable to self-report but no suicidal or " homicidal behaviors observed  Perception: Pt unable to self-report but does not appear to be responding to internal stimuli  Insight: limited  Judgment: limited  Cognition: does not appear grossly intact; formal cognitive testing was not done      PSYCHOLOGICAL TESTING:     none    ASSESSMENT     Haylee Hernadez is a 24 year old female with psychiatric diagnoses of autism. She was engaged briefly today. Mom provided most feedback. She has been doing well overall. Mom would like to keep medications the same today. Follow-up planned for 3 months. Mom to reach out earlier with any questions, concerns, or if an earlier appointment is needed.       The longitudinal plan of care for autism  were addressed during this visit. Due to the added complexity in care, I will continue to support Hoda in the subsequent management of this condition(s) and with the ongoing continuity of care of this condition(s).      6}       TREATMENT RISK STATEMENT:  The risks, benefits, alternatives and potential adverse effects have been explained and are understood by the pt and pt's parent(s)/guardian.  Discussion of specific concerns included- N/A. The  pt and pt's parent(s)/guardian agrees to the treatment plan with the ability to do so. The  pt and pt's parent(s)/guardian knows to call the clinic for any problems or access emergency care if needed. There are no medical considerations relevant to treatment, as noted above. Substance use is not a problem as noted above.      Drug interaction check was done for any med changes and is discussed above.      DIAGNOSES                                                                                                      Encounter Diagnoses   Name Primary?    Active autistic disorder     Autism                                  PLAN                                                                                                 Medication Plan:         -- continue prozac 40 mg PO Q Day  sent         -- continue lorazepam 2 mg PO Q Day PRN prior to medical or dental procedures                  use has reduced, not needed    Labs:  none    Pt monitor [call for probs]: nothing specific needed    THERAPY: No Change    REFERRALS [CD, medical, other]:  none    :  none    Controlled Substance Contract was not completed    RTC: 3 months    CRISIS NUMBERS: Provided in AVS upon request of patient/guardian.

## 2024-10-28 ENCOUNTER — TELEPHONE (OUTPATIENT)
Dept: PSYCHIATRY | Facility: CLINIC | Age: 24
End: 2024-10-28
Payer: MEDICARE

## 2024-10-28 NOTE — TELEPHONE ENCOUNTER
[facility]- Behavior Support Plan forms were received from Parent via [employer], they were printed off in clinic and are awaiting provider signature/completion.

## 2024-11-05 NOTE — TELEPHONE ENCOUNTER
The completed forms were [facility] at [email or web address] attn: [email or web address] Hernadez.

## 2024-12-09 DIAGNOSIS — F84.0 AUTISM: ICD-10-CM

## 2024-12-10 RX ORDER — LORAZEPAM 2 MG/ML
2 CONCENTRATE ORAL DAILY PRN
Qty: 30 ML | Refills: 0 | Status: SHIPPED | OUTPATIENT
Start: 2024-12-10

## 2024-12-10 NOTE — TELEPHONE ENCOUNTER
Last office visit: Visit date not found  Last virtual visit: 10/21/2024   Return to clinic: 3 months  Cancel: 0  No-show: 0    Future Office Visit:   Future Appointments 12/10/2024 - 6/8/2025        Date Visit Type Length Department Provider     1/20/2025  2:00 PM CHILD PSYCHIATRY RETURN 30 min [facility] PEDS PSYCHI[facility]RY Anjelica Gaming NP                      Incoming refill from pharmacy via e-prescribe    Medication requested:   Pending Prescriptions:                       Disp   Refills    LORAZEPAM INTENSOL 2 MG/ML (HIGH CONC) or*30 mL               Sig: TAKE 1 ML(2MG) BY MOUTH DAILY AS NEEDED(PRIOR TO           MEDICAL OR DENTAL PROCEDURES/APPOINTMENT)        Last refill per :        From chart note:       -- continue lorazepam 2 mg PO Q Day PRN prior to medical or dental procedures                  use has reduced, not needed       Medication unable to be refilled by RN due to criteria not met as indicated.                 []Eligibility - not seen in the last year              []Supervision - no future appointment              []Compliance - no shows, cancellations or lapse in therapy              []Verification - order discrepancy              [x]Controlled medication              []Medication not included in policy              []90-day supply request              []Other:

## 2025-01-20 ENCOUNTER — VIRTUAL VISIT (OUTPATIENT)
Dept: PSYCHIATRY | Facility: CLINIC | Age: 25
End: 2025-01-20
Payer: MEDICARE

## 2025-01-20 VITALS — HEIGHT: 61 IN | BODY MASS INDEX: 30.21 KG/M2 | WEIGHT: 160 LBS

## 2025-01-20 DIAGNOSIS — F84.0 ACTIVE AUTISTIC DISORDER: ICD-10-CM

## 2025-01-20 RX ORDER — FLUOXETINE 20 MG/5ML
40 SOLUTION ORAL DAILY
Qty: 900 ML | Refills: 0 | Status: SHIPPED | OUTPATIENT
Start: 2025-01-20

## 2025-01-20 NOTE — PROGRESS NOTES
Virtual Visit Details    Type of service:  Video Visit   Video Start Time:  1:50  Video End Time: 2:13    Originating Location (pt. Location): Home    Distant Location (provider location):  Off-site  Platform used for Video Visit: [facility]  PSYCHIATRY CLINIC PROGRESS NOTE    30 minute medication management   IDENTIFICATION: Haylee Hernadez is a 24 year old female with previous psychiatric diagnoses of autism. Pt presents for ongoing psychiatric follow-up and was seen for initial diagnostic evaluation on 2/19/2020 .  SUBJECTIVE / INTERIM HISTORY     The pt was last seen in clinic 10/21/24 at which time no medication changes were made. The patient reports good medication adherence. Since the last visit, she has taken her medication daily as prescribed overseen by parent or PCA. No side effects reported. She continues to tolerate lamotrigine well with improvement in seizures. Mom notes they will likely do a repeat EEG in a few months.     SYMPTOMS include continued overall improvements in frustration tolerance and anxiety, per Mom. She has days where she wakes up tired and kind of cranky which Mom relates to possible headache or nighttime seizures. However, she does note that she recently was very upset/emotional and did not have a seizure (when the first one happened). She is more emotional leading up to her period but Mom notes that prozac has helped with this. Mom will follow-up with Obgyn after seizures are better under control to consider an IUD placement.     Current Substance Use- none. Sober support- na     MEDICAL ROS          Reports A comprehensive review of systems was performed and is negative other than noted above.    PAST MEDICATION TRIALS    Prozac, current, moderately effective  Trazodone, in past for sleep  MEDICAL HISTORY      Primary Care Physician: Marck, [facility] at [address] [place] [id number]     Neurologic Hx:  head injury- none     seizure- none      LOC- none     "other- na   Patient Active Problem List   Diagnosis    XT (exotropia), intermittent    Hypermetropia    Astigmatism    Autism     ALLERGY       Allergies   Allergen Reactions    Citrus Dermatitis, Diarrhea, GI Disturbance and Other (See Comments)    Dairy Aid [Tilactase]        MEDICATIONS      Current Outpatient Medications   Medication Sig Dispense Refill    FLUoxetine (PROZAC) 20 MG/5ML solution Take 10 mLs (40 mg) by mouth daily. 300 mL 2    lamoTRIgine (LAMICTAL) 25 MG chewable tablet Take 200 mg by mouth 2 times daily.      LORazepam (LORAZEPAM INTENSOL) 2 MG/ML (HIGH CONC) oral solution Take 1 mL (2 mg) by mouth daily as needed for anxiety (prior to medical procedures). 30 mL 0     No current facility-administered medications for this visit.       Drug Interaction Check is remarkable for:  None  VITALS    Ht 1.549 m (5' 1\")   Wt 72.6 kg (160 lb)   BMI 30.23 kg/m    LABS  use [employer]______       Lab on 09/04/2024   Component Date Value Ref Range Status    Lamotrigine 09/04/2024 10.8  3.0 - 15.0 ug/mL Final    INTERPRETIVE INFORMATION:  Lamotrigine    Therapeutic Range:  3.0-15.0 ug/mL              Toxic:  Greater than or equal to 20 ug/mL     Pharmacokinetics varies widely, particularly with   co-medications and/or compromised renal function.  Adverse   effects may include dizziness, somnolence, nausea and   vomiting.  Performed By: [facility] [employer]  [address] [place], [place] [id number]  : Doc Felton MD, PhD  CLIA Number: [id number]       MENTAL STATUS EXAM     Alertness: alert  and oriented  Appearance: casually groomed  Behavior/Demeanor: calm, with poor eye contact  Speech: Pt did not speak  Language: Pt is mostly non-verbal  Psychomotor: calm  Mood:  \"okay\" per Mom  Affect: blunted   Thought Process/Associations: Unable to self-report, was able to follow directions  Thought Content: Pt unable to self-report but no suicidal or homicidal behaviors observed  Perception: Pt " unable to self-report but does not appear to be responding to internal stimuli  Insight: limited  Judgment: limited  Cognition: does not appear grossly intact; formal cognitive testing was not done      PSYCHOLOGICAL TESTING:     none    ASSESSMENT     Haylee Hernadez is a 24 year old female with psychiatric diagnoses of autism. She was engaged briefly today. Mom provided most feedback. She has been doing well overall. Mom would like to keep medications the same today. Follow-up planned for 3 months. Mom to reach out sooner with any questions, concerns, or if an earlier an appointment is needed.          The longitudinal plan of care for autism were addressed during this visit. Due to the added complexity in care, I will continue to support Hoda in the subsequent management of this condition(s) and with the ongoing continuity of care of this condition(s).      TREATMENT RISK STATEMENT:  The risks, benefits, alternatives and potential adverse effects have been explained and are understood by the pt and pt's parent(s)/guardian.  Discussion of specific concerns included- N/A. The  pt and pt's parent(s)/guardian agrees to the treatment plan with the ability to do so. The  pt and pt's parent(s)/guardian knows to call the clinic for any problems or access emergency care if needed. There are no medical considerations relevant to treatment, as noted above. Substance use is not a problem as noted above.      Drug interaction check was done for any med changes and is discussed above.      DIAGNOSES                                                                                                      Encounter Diagnosis   Name Primary?    Active autistic disorder                                  PLAN                                                                                                 Medication Plan:         -- continue prozac 40 mg PO Q Day  sent 90 days on parent request to [employer]       -- continue  lorazepam 2 mg PO Q Day PRN prior to medical or dental procedures                  use has reduced, not needed    Labs:  none    Pt monitor [call for probs]: nothing specific needed    THERAPY: No Change    REFERRALS [CD, medical, other]:  none    :  none    Controlled Substance Contract was not completed    RTC: 3 months    CRISIS NUMBERS: Provided in AVS upon request of patient/guardian.

## 2025-01-20 NOTE — NURSING NOTE
Current patient location: [address] [place] [address] [address] [facility] [address]    Is the patient currently in the state of [place]? YES    Visit mode: VIDEO    If the visit is dropped, the patient can be reconnected by:VIDEO VISIT: Text to cell phone:   Telephone Information:   Mobile [phone]       Will anyone else be joining the visit? NO  (If patient encounters technical issues they should call [phone])    Are changes needed to the allergy or medication list? No    Are refills needed on medications prescribed by this physician? YES    Rooming Documentation:  Questionnaire(s) completed    Reason for visit: RECHECK    Charisma ROSARIOF

## 2025-04-22 ENCOUNTER — VIRTUAL VISIT (OUTPATIENT)
Dept: PSYCHIATRY | Facility: CLINIC | Age: 25
End: 2025-04-22
Payer: MEDICARE

## 2025-04-22 VITALS — HEIGHT: 61 IN | BODY MASS INDEX: 32.1 KG/M2 | WEIGHT: 170 LBS

## 2025-04-22 DIAGNOSIS — F84.0 ACTIVE AUTISTIC DISORDER: ICD-10-CM

## 2025-04-22 DIAGNOSIS — G40.909 SEIZURE DISORDER (H): Primary | ICD-10-CM

## 2025-04-22 DIAGNOSIS — F84.0 AUTISM: ICD-10-CM

## 2025-04-22 RX ORDER — LORAZEPAM 2 MG/ML
2 CONCENTRATE ORAL DAILY PRN
Qty: 30 ML | Refills: 0 | Status: CANCELLED | OUTPATIENT
Start: 2025-04-22

## 2025-04-22 RX ORDER — FLUOXETINE 20 MG/5ML
40 SOLUTION ORAL DAILY
Qty: 900 ML | Refills: 0 | Status: SHIPPED | OUTPATIENT
Start: 2025-04-22

## 2025-04-22 NOTE — NURSING NOTE
Current patient location: [address] [place] [address] DR JONES [facility] [id number]    Is the patient currently in the state of [place]? YES    Visit mode: VIDEO    If the visit is dropped, the patient can be reconnected by:VIDEO VISIT: Text to cell phone:   Telephone Information:   Mobile [phone]       Will anyone else be joining the visit? Mom  (If patient encounters technical issues they should call [phone])    Are changes needed to the allergy or medication list? No    Are refills needed on medications prescribed by this physician? YES    Rooming Documentation:  Patient will complete questionnaire(s) in [facility]    Reason for visit: RECHECK    Pepper ROSARIOF

## 2025-04-22 NOTE — PROGRESS NOTES
Virtual Visit Details    Type of service:  Video Visit   Video Start Time:  1:32  Video End Time: 1:45    Originating Location (pt. Location): Home    Distant Location (provider location):  Off-site  Platform used for Video Visit: [facility]  PSYCHIATRY CLINIC PROGRESS NOTE    30 minute medication management   IDENTIFICATION: aHylee Hernadez is a 25 year old female with previous psychiatric diagnoses of autism. Pt presents for ongoing psychiatric follow-up and was seen for initial diagnostic evaluation on 2/19/2020 .   SUBJECTIVE / INTERIM HISTORY     The pt was last seen in clinic 1/20/25 at which time no medication changes were made. The patient reports good medication adherence. Since the last visit, she has taken her medication daily overseen by family/PCA. No side effects observed. She is due for updated testing. Mom will look into specifics of what is needed.    SYMPTOMS include ongoing improvements overall in frustration tolerance. Sleep is somewhat disrupted, staying up later at night and napping more during the day. Mom also adds that she is on her period and has been more tired/less hungry recently. No safety concerns reported.     Current Substance Use- none. Sober support- na     MEDICAL ROS          Reports A comprehensive review of systems was performed and is negative other than noted above.    PAST MEDICATION TRIALS    Prozac, current, moderately effective  Trazodone, in past for sleep  MEDICAL HISTORY      Primary Care Physician: Clinic, [facility] at [address] [place] [id number]     Neurologic Hx:  head injury- none     seizure- none known, managed by neurology     LOC- none    other- na   Patient Active Problem List   Diagnosis    XT (exotropia), intermittent    Hypermetropia    Astigmatism    Autism     ALLERGY       Allergies   Allergen Reactions    Citrus Dermatitis, Diarrhea, GI Disturbance and Other (See Comments)    Dairy Aid [Tilactase]        MEDICATIONS      Current  "Outpatient Medications   Medication Sig Dispense Refill    FLUoxetine (PROZAC) 20 MG/5ML solution Take 10 mLs (40 mg) by mouth daily. 900 mL 0    lamoTRIgine (LAMICTAL) 25 MG chewable tablet Take 200 mg by mouth 2 times daily.      LORazepam (LORAZEPAM INTENSOL) 2 MG/ML (HIGH CONC) oral solution Take 1 mL (2 mg) by mouth daily as needed for anxiety (prior to medical procedures). 30 mL 0     No current facility-administered medications for this visit.       Drug Interaction Check is remarkable for:  None  VITALS    There were no vitals taken for this visit.  LABS  use VerslyLAB______       Lab on 09/04/2024   Component Date Value Ref Range Status    Lamotrigine 09/04/2024 10.8  3.0 - 15.0 ug/mL Final    INTERPRETIVE INFORMATION:  Lamotrigine    Therapeutic Range:  3.0-15.0 ug/mL              Toxic:  Greater than or equal to 20 ug/mL     Pharmacokinetics varies widely, particularly with   co-medications and/or compromised renal function.  Adverse   effects may include dizziness, somnolence, nausea and   vomiting.  Performed By: [facility]  [address] [place] [address]  : Doc Felton MD, PhD  CLIA Number: [id number]       MENTAL STATUS EXAM     Alertness: alert  and oriented  Appearance: casually groomed  Behavior/Demeanor: calm, with poor eye contact  Speech: Pt did not speak  Language: Pt is mostly non-verbal  Psychomotor: calm  Mood:  \"okay\" per Mom  Affect: blunted   Thought Process/Associations: Unable to self-report, was able to follow directions  Thought Content: Pt unable to self-report but no suicidal or homicidal behaviors observed  Perception: Pt unable to self-report but does not appear to be responding to internal stimuli  Insight: limited  Judgment: limited  Cognition: does not appear grossly intact; formal cognitive testing was not done      PSYCHOLOGICAL TESTING:     none    ASSESSMENT     Haylee Hernadez is a 25 year old female with psychiatric " diagnoses of autism. She was present briefly today and gestured toward the camera. Mom provided most of the feedback. Overall things have been going well. They continue to work on managing seizures. Mom would like to keep medications the same today. Follow-up planned for 3 months. Mom to reach out sooner with any questions, concerns, or if an earlier appointment is needed.          I was present with the student Humera Springer, who participated in the service and in the documentation of the note. I have verified the history and personally performed the psychiatric exam and medical decision-making. I agree with the assessment and plan of care as documented in the note.     The longitudinal plan of care for autism were addressed during this visit. Due to the added complexity in care, I will continue to support Hoda in the subsequent management of this condition(s) and with the ongoing continuity of care of this condition(s).      6}       TREATMENT RISK STATEMENT:  The risks, benefits, alternatives and potential adverse effects have been explained and are understood by the pt and pt's parent(s)/guardian.  Discussion of specific concerns included- N/A. The  pt and pt's parent(s)/guardian agrees to the treatment plan with the ability to do so. The  pt and pt's parent(s)/guardian knows to call the clinic for any problems or access emergency care if needed. There are medical considerations relevant to treatment, as noted above. Substance use is not a problem as noted above.      Drug interaction check was done for any med changes and is discussed above.      DIAGNOSES                                                                                                      Encounter Diagnoses   Name Primary?    Seizure disorder (H) Yes    Autism                                    PLAN                                                                                                 Medication Plan:         -- continue prozac 40 mg  PO Q Day  sent 90 days on parent request to [facility]       -- continue lorazepam 2 mg PO Q Day PRN prior to medical or dental procedures                  not needed    Labs:  none    Pt monitor [call for probs]: nothing specific needed    THERAPY: No Change    REFERRALS [CD, medical, other]:  none    :  none    Controlled Substance Contract was not completed    RTC: 3 months    CRISIS NUMBERS: Provided in AVS upon request of patient/guardian.

## 2025-05-11 ENCOUNTER — HEALTH MAINTENANCE LETTER (OUTPATIENT)
Age: 25
End: 2025-05-11

## 2025-06-09 ENCOUNTER — APPOINTMENT (OUTPATIENT)
Dept: CT IMAGING | Facility: CLINIC | Age: 25
End: 2025-06-09
Attending: EMERGENCY MEDICINE
Payer: MEDICARE

## 2025-06-09 ENCOUNTER — HOSPITAL ENCOUNTER (EMERGENCY)
Facility: CLINIC | Age: 25
Discharge: HOME | End: 2025-06-09
Attending: EMERGENCY MEDICINE | Admitting: EMERGENCY MEDICINE
Payer: MEDICARE

## 2025-06-09 VITALS
HEART RATE: 109 BPM | TEMPERATURE: 97.6 F | RESPIRATION RATE: 18 BRPM | BODY MASS INDEX: 29.91 KG/M2 | DIASTOLIC BLOOD PRESSURE: 78 MMHG | SYSTOLIC BLOOD PRESSURE: 117 MMHG | WEIGHT: 158.29 LBS | OXYGEN SATURATION: 96 %

## 2025-06-09 DIAGNOSIS — S80.11XA CONTUSION OF MULTIPLE SITES OF RIGHT LOWER EXTREMITY, INITIAL ENCOUNTER: ICD-10-CM

## 2025-06-09 DIAGNOSIS — S09.90XA INJURY OF HEAD, INITIAL ENCOUNTER: ICD-10-CM

## 2025-06-09 DIAGNOSIS — S40.022A ARM CONTUSION, LEFT, INITIAL ENCOUNTER: ICD-10-CM
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
## 2025-06-09 PROCEDURE — 99284 EMERGENCY DEPT VISIT MOD MDM: CPT | Mod: 25 | Performed by: EMERGENCY MEDICINE

## 2025-06-09 PROCEDURE — 250N000013 HC RX MED GY IP 250 OP 250 PS 637: Performed by: EMERGENCY MEDICINE

## 2025-06-09 PROCEDURE — 70450 CT HEAD/BRAIN W/O DYE: CPT

## 2025-06-09 RX ORDER — ACETAMINOPHEN 325 MG/10.15ML
650 LIQUID ORAL ONCE
Status: COMPLETED | OUTPATIENT
Start: 2025-06-09 | End: 2025-06-09

## 2025-06-09 RX ADMIN — ACETAMINOPHEN 650 MG: 325 SUSPENSION ORAL at 17:15

## 2025-06-09 ASSESSMENT — COLUMBIA-SUICIDE SEVERITY RATING SCALE - C-SSRS
6. HAVE YOU EVER DONE ANYTHING, STARTED TO DO ANYTHING, OR PREPARED TO DO ANYTHING TO END YOUR LIFE?: NO
1. IN THE PAST MONTH, HAVE YOU WISHED YOU WERE DEAD OR WISHED YOU COULD GO TO SLEEP AND NOT WAKE UP?: NO
2. HAVE YOU ACTUALLY HAD ANY THOUGHTS OF KILLING YOURSELF IN THE PAST MONTH?: NO

## 2025-06-09 ASSESSMENT — ACTIVITIES OF DAILY LIVING (ADL): ADLS_ACUITY_SCORE: 41

## 2025-06-09 NOTE — ED TRIAGE NOTES
Pt was going up the stairs when she slipped and fell backwards about 1/2 flight of stairs. No LOC. No thinners. NO NV. Hx of Autism, nonverbal. Per mom, pt is more agitated than normal otherwise mentation is at baseline.

## 2025-06-09 NOTE — ED PROVIDER NOTES
Emergency Department Note      History of Present Illness     Chief Complaint   Fall and Head Injury      HPI   Haylee Hernadez is a 25 year old female     Nonverbal at baseline, here with her mother after a witnessed fall by a staff member at her place of living as she was going up stairs she slipped down onto her knees and then fell backwards for 3-4 steps.  No loss of conscious.  No postevent vomiting.  Mom states she is a little more agitated than normal.  Mom had noticed some bruising on the right lower extremity as well as the left forearm.  Not on anticoagulants.    Independent Historian   Mother.    Review of External Notes       Past Medical History     Medical History and Problem List   No past medical history on file.    Medications   FLUoxetine (PROZAC) 20 MG/5ML solution  lamoTRIgine (LAMICTAL) 25 MG chewable tablet  LORazepam (LORAZEPAM INTENSOL) 2 MG/ML (HIGH CONC) oral solution        Surgical History   No past surgical history on file.    Physical Exam     Patient Vitals for the past 24 hrs:   BP Temp Temp src Pulse Resp SpO2 Weight   06/09/25 1649 117/78 97.6  F (36.4  C) Temporal 109 18 96 % 71.8 kg (158 lb 4.6 oz)     HEENT: Atraumatic, normocephalic, pupils equal, midface stable  Neck: No posterior midline tenderness    Extremity: Skeletal survey unremarkable for reproducible bony tenderness to palpation, major joint effusion or palpable deformity.    Skin: Right lower extremity on the lower leg there is superficial erythema and ecchymoses about the anterior portion of the knee joint and lateral aspect of the right mid calf.  Extensor mechanism intact.  No knee joint effusion can weight-bear without difficulty.  Similar findings in the left forearm.  Elbow joint without effusion, painless range of motion.    CV: ppi, regular   Resp: speaking in full sentences without any resp distress  Skin: warm dry well perfused  Neuro: Alert, at her baseline.,  Gait stable            Diagnostics      Lab Results   Labs Ordered and Resulted from Time of ED Arrival to Time of ED Departure - No data to display    Imaging   Head CT w/o contrast   Final Result   IMPRESSION: Negative for acute intracranial hemorrhage or skull fracture.          EKG       Independent Interpretation   CT Head: No intracranial hemorrhage.    ED Course      Medications Administered   Medications   acetaminophen (TYLENOL) oral liquid 650 mg (650 mg Oral $Given 6/9/25 3626)       Procedures   Procedures     Discussion of Management       ED Course        Additional Documentation      Medical Decision Making / Diagnosis     CMS Diagnoses:         [facility]   Haylee Hernadez is a 25 year old female     Presenting after fall down a few stairs.  Given patient is nonverbal discussed with mom in a shared decision-making fashion the role of a head CT which she would like to do and so this was done.  Fortunately this shows no skull fracture nor intracranial hemorrhage or other abnormality.  Skeletal survey is otherwise unremarkable other than some mild contusions.  Nothing I think needs a radiograph.  Mom comfortable and agreeable with that plan.  Discharge home.  Mom comfortable agreeable with that plan.    Disposition   The patient was discharged.     Diagnosis     ICD-10-CM    1. Fall down stairs, initial encounter  W10.8XXA       2. Injury of head, initial encounter  S09.90XA       3. Contusion of multiple sites of right lower extremity, initial encounter  S80.11XA       4. Arm contusion, left, initial encounter  S40.022A            Discharge Medications   Discharge Medication List as of 6/9/2025  5:38 PM            MD Patrick Hernandez Jerome Richard, MD  06/09/25 6293

## 2025-07-21 NOTE — PROGRESS NOTES
Virtual Visit Details    Type of service:  Video Visit   Video Start Time: 1:34 PM  Video End Time:1:42 PM    Originating Location (pt. Location): Home    Distant Location (provider location):  Off-site  Platform used for Video Visit: [facility]      PSYCHIATRY CLINIC PROGRESS NOTE    30 minute medication management   IDENTIFICATION: Haylee Hernadez is a 25 year old female with previous psychiatric diagnoses of autism. Pt presents for ongoing psychiatric follow-up and was seen for initial diagnostic evaluation on 2/19/2020.  SUBJECTIVE / INTERIM HISTORY     The pt was last seen in clinic 4/22/2025 at which time no medication changes were made. The patient reports good medication adherence. Since the last visit, she has taken her medication daily overseen by family/PCA. Hoda had a fall down the stairs, hit her head, and was brought to the ED. CT was obtained and head injury ruled out.     SYMPTOMS include some emotional lability but overall remains at baseline. Mom is working on attempting to identify potential patterns, maybe related to pre-menstrual. Sleep continues to be somewhat disrupted by staying up late. Hoda has also been sucking on her arms and causing bruises. No safety concerns reported today.     Current Substance Use- none. Sober support- na     MEDICAL ROS          Reports A comprehensive review of systems was performed and is negative other than noted above.    PAST MEDICATION TRIALS    Prozac, current, moderately effective  Trazodone, in past for sleep  MEDICAL HISTORY      Primary Care Physician: Clinic, [facility] at [address] [facility] [address]     Neurologic Hx:  head injury- none     seizure- none known, managed by neurology  LOC- none    other- na   Patient Active Problem List   Diagnosis    XT (exotropia), intermittent    Hypermetropia    Astigmatism    Autism     ALLERGY       Allergies   Allergen Reactions    Citrus Dermatitis, Diarrhea, GI Disturbance and Other (See  "Comments)    Dairy Aid [Tilactase]        MEDICATIONS      Current Outpatient Medications   Medication Sig Dispense Refill    FLUoxetine (PROZAC) 20 MG/5ML solution Take 10 mLs (40 mg) by mouth daily. 900 mL 0    lamoTRIgine (LAMICTAL) 25 MG chewable tablet Take 200 mg by mouth 2 times daily.      LORazepam (LORAZEPAM INTENSOL) 2 MG/ML (HIGH CONC) oral solution Take 1 mL (2 mg) by mouth daily as needed for anxiety (prior to medical procedures). 30 mL 0     No current facility-administered medications for this visit.       Drug Interaction Check is remarkable for:  None  VITALS    There were no vitals taken for this visit.  LABS  use PSYCHLAB______       none    MENTAL STATUS EXAM     Alertness: alert  and oriented  Appearance: casually groomed  Behavior/Demeanor: calm, with poor eye contact  Speech: Pt did not speak  Language: Pt is mostly non-verbal  Psychomotor: calm  Mood:  \"good\" per Mom  Affect: blunted   Thought Process/Associations: Unable to self-report, was able to follow directions  Thought Content: Pt unable to self-report but no suicidal or homicidal behaviors observed  Perception: Pt unable to self-report but does not appear to be responding to internal stimuli  Insight: limited  Judgment: limited  Cognition: does not appear grossly intact; formal cognitive testing was not done      PSYCHOLOGICAL TESTING:     none    ASSESSMENT     Haylee Hernadez is a 25 year old female with psychiatric diagnoses of autism. She was present briefly today and gestured toward the camera. Mom provided most of the feedback. Discussed continued monitoring for patterns to emotions and reactivity. Overall things have been going well and Mom would like to keep medications the same today. Follow-up planned for 3 months. Mom to reach out sooner with any questions, concerns, or if an earlier appointment is needed.          I was present with the student Humera Springer, who participated in the service and in the " documentation of the note. I have verified the history and personally performed the psychiatric exam and medical decision-making. I agree with the assessment and plan of care as documented in the note.     The longitudinal plan of care for autism were addressed during this visit. Due to the added complexity in care, I will continue to support Hoda in the subsequent management of this condition(s) and with the ongoing continuity of care of this condition(s).      6}       TREATMENT RISK STATEMENT:  The risks, benefits, alternatives and potential adverse effects have been explained and are understood by the pt and pt's parent(s)/guardian.  Discussion of specific concerns included- N/A. The  pt and pt's parent(s)/guardian agrees to the treatment plan with the ability to do so. The  pt and pt's parent(s)/guardian knows to call the clinic for any problems or access emergency care if needed. There are medical considerations relevant to treatment, as noted above. Substance use is not a problem as noted above.      Drug interaction check was done for any med changes and is discussed above.      DIAGNOSES                                                                                                      Encounter Diagnoses   Name Primary?    Autism Yes    Seizure disorder (H)                                  PLAN                                                                                                 Medication Plan:         -- continue prozac 40 mg PO Q Day  sent 90 days on parent request to [facility]       -- continue lorazepam 2 mg PO Q Day PRN prior to medical or dental procedures                  not needed    Labs:  none     Pt monitor [call for probs]: nothing specific needed     THERAPY: No Change     REFERRALS [CD, medical, other]:  none     :  none     Controlled Substance Contract was not completed     RTC: 3 months    CRISIS NUMBERS: Provided in AVS upon request of patient/guardian.

## 2025-07-22 ENCOUNTER — VIRTUAL VISIT (OUTPATIENT)
Dept: PSYCHIATRY | Facility: CLINIC | Age: 25
End: 2025-07-22
Payer: MEDICARE

## 2025-07-22 VITALS — BODY MASS INDEX: 29.91 KG/M2 | HEIGHT: 61 IN

## 2025-07-22 DIAGNOSIS — F84.0 ACTIVE AUTISTIC DISORDER: ICD-10-CM

## 2025-07-22 DIAGNOSIS — G40.909 SEIZURE DISORDER (H): ICD-10-CM

## 2025-07-22 DIAGNOSIS — F84.0 AUTISM: Primary | ICD-10-CM

## 2025-07-22 PROCEDURE — G2211 COMPLEX E/M VISIT ADD ON: HCPCS | Mod: 95 | Performed by: NURSE PRACTITIONER

## 2025-07-22 PROCEDURE — 1126F AMNT PAIN NOTED NONE PRSNT: CPT | Mod: 95 | Performed by: NURSE PRACTITIONER

## 2025-07-22 PROCEDURE — 98006 SYNCH AUDIO-VIDEO EST MOD 30: CPT | Performed by: NURSE PRACTITIONER

## 2025-07-22 RX ORDER — FLUOXETINE 20 MG/5ML
40 SOLUTION ORAL DAILY
Qty: 900 ML | Refills: 0 | Status: SHIPPED | OUTPATIENT
Start: 2025-07-22

## 2025-07-22 ASSESSMENT — PAIN SCALES - GENERAL: PAINLEVEL_OUTOF10: NO PAIN (0)

## 2025-07-22 NOTE — NURSING NOTE
Current patient location: [address] [place] [address] DR JONES [facility] [address]    Is the patient currently in the state of [place]? YES    Visit mode: VIDEO    If the visit is dropped, the patient can be reconnected by:VIDEO VISIT: Text to cell phone:   Telephone Information:   Mobile [phone]       Will anyone else be joining the visit? Mom  (If patient encounters technical issues they should call [phone]    Are changes needed to the allergy or medication list? No    Are refills needed on medications prescribed by this physician? YES    Rooming Documentation:  Unable to complete qnrs, patient is non-verbal    Reason for visit: RECHECK    Pepper ROSARIOF